# Patient Record
Sex: FEMALE | Race: WHITE | Employment: FULL TIME | URBAN - METROPOLITAN AREA
[De-identification: names, ages, dates, MRNs, and addresses within clinical notes are randomized per-mention and may not be internally consistent; named-entity substitution may affect disease eponyms.]

---
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
## 2019-01-16 ENCOUNTER — OFFICE VISIT (OUTPATIENT)
Dept: OBGYN | Facility: CLINIC | Age: 19
End: 2019-01-16
Attending: NURSE PRACTITIONER
Payer: COMMERCIAL

## 2019-01-16 VITALS
HEART RATE: 77 BPM | WEIGHT: 171.1 LBS | DIASTOLIC BLOOD PRESSURE: 81 MMHG | SYSTOLIC BLOOD PRESSURE: 116 MMHG | HEIGHT: 61 IN | BODY MASS INDEX: 32.3 KG/M2

## 2019-01-16 DIAGNOSIS — Z30.431 ENCOUNTER FOR ROUTINE CHECKING OF INTRAUTERINE CONTRACEPTIVE DEVICE (IUD): Primary | ICD-10-CM

## 2019-01-16 PROCEDURE — G0463 HOSPITAL OUTPT CLINIC VISIT: HCPCS | Mod: ZF

## 2019-01-16 ASSESSMENT — MIFFLIN-ST. JEOR: SCORE: 1488.48

## 2019-01-16 NOTE — LETTER
"1/16/2019       RE: Arelis Hernandes  [address]  [place] [address]     Dear Colleague,    Thank you for referring your patient, Arelis Hernandes, to the [facility] at [facility]. Please see a copy of my visit note below.    SUBJECTIVE:  Arelis Hernandes is a 18 yr old female who presents to clinic today for a routine IUD string check.    She had a Paragard IUD placed on 11/29/2018. She is overall pleased with this contraceptive method and has not concerns today. She had her menses at the time of IUD placement. This lasted 10 days and was slightly heavier than previous menses with more dysmenorrhea.  Since that time, she has had no pelvic pain or bleeding. She started her next period yesterday so had noted onset of spotting and some uterine cramping. Has been able to feel for her IUD strings. Has not been sexually active yet. Denies fever or odor.    OBJECTIVE:  /81 (BP Location: Left arm, Patient Position: Chair)   Pulse 77   Ht 1.549 m (5' 1\")   Wt 77.6 kg (171 lb 1.6 oz)   BMI 32.33 kg/m     General: pleasant female in no acute distress  Abdomen: soft, non-tender  Pelvic Exam:  Vulva: No external lesions, normal hair distribution, no adenopathy  Vagina: Moist, pink, scant brown discharge without odor; well rugated, no lesions  Cervix: nulliparous, smooth, pink, no visible lesions; IUD strings extend 4cm from the external cervical os   Uterus: Normal size, anteverted, non-tender, mobile  Ovaries: No mass, non-tender, mobile    ASSESSMENT:  (Z30.431) Encounter for routine checking of intrauterine contraceptive device (IUD)  (primary encounter diagnosis)    PLAN:  Normal IUD string check. IUD strings were the anticipated length based on insertion note (4cm).  Encouraged monthly IUD string checks. Return to clinic if new onset of pelvic pain, dyspareunia, or intermenstrual bleeding. May manage dysmenorrhea with application of heat and ibuprofen. " Return to clinic for annual exam in 1 year.    Arelis expressed understanding and agreement with the plan for care.    Alice Ramirez, DNP, APRN, WHNP

## 2019-01-16 NOTE — PROGRESS NOTES
"SUBJECTIVE:  Arelis Hernandes is a 18 yr old female who presents to clinic today for a routine IUD string check.    She had a Paragard IUD placed on 11/29/2018. She is overall pleased with this contraceptive method and has not concerns today. She had her menses at the time of IUD placement. This lasted 10 days and was slightly heavier than previous menses with more dysmenorrhea.  Since that time, she has had no pelvic pain or bleeding. She started her next period yesterday so had noted onset of spotting and some uterine cramping. Has been able to feel for her IUD strings. Has not been sexually active yet. Denies fever or odor.      OBJECTIVE:  /81 (BP Location: Left arm, Patient Position: Chair)   Pulse 77   Ht 1.549 m (5' 1\")   Wt 77.6 kg (171 lb 1.6 oz)   BMI 32.33 kg/m    General: pleasant female in no acute distress  Abdomen: soft, non-tender  Pelvic Exam:  Vulva: No external lesions, normal hair distribution, no adenopathy  Vagina: Moist, pink, scant brown discharge without odor; well rugated, no lesions  Cervix: nulliparous, smooth, pink, no visible lesions; IUD strings extend 4cm from the external cervical os   Uterus: Normal size, anteverted, non-tender, mobile  Ovaries: No mass, non-tender, mobile    ASSESSMENT:  (Z30.431) Encounter for routine checking of intrauterine contraceptive device (IUD)  (primary encounter diagnosis)      PLAN:  Normal IUD string check. IUD strings were the anticipated length based on insertion note (4cm).  Encouraged monthly IUD string checks. Return to clinic if new onset of pelvic pain, dyspareunia, or intermenstrual bleeding. May manage dysmenorrhea with application of heat and ibuprofen. Return to clinic for annual exam in 1 year.    Arelis expressed understanding and agreement with the plan for care.    Alice Ramirez, DNP, APRN, WHNP    "

## 2019-01-24 ENCOUNTER — COMMUNICATION - HEALTHEAST (OUTPATIENT)
Dept: PEDIATRICS | Facility: CLINIC | Age: 19
End: 2019-01-24

## 2019-11-08 ENCOUNTER — HEALTH MAINTENANCE LETTER (OUTPATIENT)
Age: 19
End: 2019-11-08

## 2020-09-16 ENCOUNTER — VIRTUAL VISIT (OUTPATIENT)
Dept: FAMILY MEDICINE | Facility: OTHER | Age: 20
End: 2020-09-16
Payer: COMMERCIAL

## 2020-09-16 DIAGNOSIS — Z20.822 SUSPECTED COVID-19 VIRUS INFECTION: Primary | ICD-10-CM

## 2020-09-16 DIAGNOSIS — Z20.822 SUSPECTED COVID-19 VIRUS INFECTION: ICD-10-CM

## 2020-09-16 PROCEDURE — U0003 INFECTIOUS AGENT DETECTION BY NUCLEIC ACID (DNA OR RNA); SEVERE ACUTE RESPIRATORY SYNDROME CORONAVIRUS 2 (SARS-COV-2) (CORONAVIRUS DISEASE [COVID-19]), AMPLIFIED PROBE TECHNIQUE, MAKING USE OF HIGH THROUGHPUT TECHNOLOGIES AS DESCRIBED BY CMS-2020-01-R: HCPCS | Performed by: FAMILY MEDICINE

## 2020-09-16 PROCEDURE — 99421 OL DIG E/M SVC 5-10 MIN: CPT | Performed by: PHYSICIAN ASSISTANT

## 2020-09-16 NOTE — PROGRESS NOTES
"Date: 2020 11:38:57  Clinician: Peggy Samuel  Clinician NPI: [id number]  Patient: Arelis Hernandes  Patient : 2000  Patient Address: [address] [place] [address]  Patient Phone: [phone]  Visit Protocol: URI  Patient Summary:  Arelis is a 20 year old ( : 2000 ) female who initiated a [facility] Visit for COVID-19 (Coronavirus) evaluation and screening. When asked the question \"Please sign me up to receive news, health information and promotions from [facility].\", Arelis responded \"No\".    Arelis states her symptoms started 1-2 days ago.   Her symptoms consist of malaise, anosmia, a sore throat, a cough, and nasal congestion. She is experiencing difficulty breathing due to nasal congestion but she is not short of breath.   Symptom details     Nasal secretions: The color of her mucus is green.    Cough: Arelis coughs every 5-10 minutes and her cough is more bothersome at night. Phlegm comes into her throat when she coughs. She does not believe her cough is caused by post-nasal drip. The color of the phlegm is white and clear.     Sore throat: Arelis reports having mild throat pain (1-3 on a 10 point pain scale), does not have exudate on her tonsils, and can swallow liquids. She is not sure if the lymph nodes in her neck are enlarged. A rash has not appeared on the skin since the sore throat started.      Arelis denies having chills, myalgias, facial pain or pressure, fever, ear pain, vomiting, rhinitis, nausea, wheezing, teeth pain, ageusia, diarrhea, and headache. She also denies taking antibiotic medication in the past month and having recent facial or sinus surgery in the past 60 days.   Precipitating events  Within the past week, Arelis has not been exposed to someone with strep throat. She has not recently been exposed to someone with influenza. Arelis has not been in close contact with any high risk individuals.   Pertinent COVID-19 (Coronavirus) information  In the past 14 " days, Arelis has not worked in a congregate living setting.   She does not work or volunteer as healthcare worker or a  and does not work or volunteer in a healthcare facility.   Arelis has lived in a congregate living setting in the past 14 days. She does not live with a healthcare worker.   Arelis has had a close contact with a laboratory-confirmed COVID-19 patient within 14 days of symptom onset.   Since December 2019, Arelis and has not had upper respiratory infection or influenza-like illness. Has not been diagnosed with lab-confirmed COVID-19 test   Pertinent medical history  Arelis does not get yeast infections when she takes antibiotics.   Arelis does not need a return to work/school note.   Weight: 170 lbs   Arelis does not smoke or use smokeless tobacco.   She denies pregnancy and denies breastfeeding. She is currently menstruating.   Weight: 170 lbs    MEDICATIONS: sertraline oral, ALLERGIES: NKDA  Clinician Response:  Dear Arelis,   Your symptoms show that you may have coronavirus (COVID-19). This illness can cause fever, cough and trouble breathing. Many people get a mild case and get better on their own. Some people can get very sick.  What should I do?  We would like to test you for this virus.   1. Please call [phone] to schedule your visit. Explain that you were [facility] to have a COVID-19 test. Be ready to share your [facility] visit ID number.  The following will serve as your written order for this COVID Test, ordered by me, for the indication of suspected COVID [Z20.828]: The test will be ordered in [employer], our electronic health record, after you are scheduled. It will show as ordered and authorized by Domenic Thacker MD.  Order: COVID-19 (Coronavirus) PCR for SYMPTOMATIC testing from [facility].      2. When it's time for your COVID test:  Stay at least 6 feet away from others. (If someone will drive you to your test, stay in the backseat, as far away from the  as you  "can.)   Cover your mouth and nose with a mask, tissue or washcloth.  Go straight to the testing site. Don't make any stops on the way there or back.      3.Starting now: Stay home and away from others (self-isolate) until:   You've had no fever---and no medicine that reduces fever---for one full day (24 hours). And...   Your other symptoms have gotten better. For example, your cough or breathing has improved. And...   At least 10 days have passed since your symptoms started.       During this time, don't leave the house except for testing or medical care.   Stay in your own room, even for meals. Use your own bathroom if you can.   Stay away from others in your home. No hugging, kissing or shaking hands. No visitors.  Don't go to work, school or anywhere else.    Clean \"high touch\" surfaces often (doorknobs, counters, handles, etc.). Use a household cleaning spray or wipes. You'll find a full list of  on the EPA website: www.epa.gov/pesticide-registration/list-n-disinfectants-use-against-sars-cov-2.   Cover your mouth and nose with a mask, tissue or washcloth to avoid spreading germs.  Wash your hands and face often. Use soap and water.  Caregivers in these groups are at risk for severe illness due to COVID-19:  o People 65 years and older  o People who live in a nursing home or long-term care facility  o People with chronic disease (lung, heart, cancer, diabetes, kidney, liver, immunologic)  o People who have a weakened immune system, including those who:   Are in cancer treatment  Take medicine that weakens the immune system, such as corticosteroids  Had a bone marrow or organ transplant  Have an immune deficiency  Have poorly controlled HIV or AIDS  Are obese (body mass index of 40 or higher)  Smoke regularly   o Caregivers should wear gloves while washing dishes, handling laundry and cleaning bedrooms and bathrooms.  o Use caution when washing and drying laundry: Don't shake dirty laundry, and use the " warmest water setting that you can.  o For more tips, go to www.cdc.gov/coronavirus/2019-ncov/downloads/10Things.pdf.    4.Sign up for [employer]. We know it's scary to hear that you might have COVID-19. We want to track your symptoms to make sure you're okay over the next 2 weeks. Please look for an email from [employer]---this is a free, online program that we'll use to keep in touch. To sign up, follow the link in the email. Learn more at [email or web address]  How can I take care of myself?   Get lots of rest. Drink extra fluids (unless a doctor has told you not to).   Take Tylenol (acetaminophen) for fever or pain. If you have liver or kidney problems, ask your family doctor if it's okay to take Tylenol.   Adults can take either:    650 mg (two 325 mg pills) every 4 to 6 hours, or...   1,000 mg (two 500 mg pills) every 8 hours as needed.    Note: Don't take more than 3,000 mg in one day. Acetaminophen is found in many medicines (both prescribed and over-the-counter medicines). Read all labels to be sure you don't take too much.   For children, check the Tylenol bottle for the right dose. The dose is based on the child's age or weight.    If you have other health problems (like cancer, heart failure, an organ transplant or severe kidney disease): Call your specialty clinic if you don't feel better in the next 2 days.       Know when to call 911. Emergency warning signs include:    Trouble breathing or shortness of breath Pain or pressure in the chest that doesn't go away Feeling confused like you haven't felt before, or not being able to wake up Bluish-colored lips or face.  Where can I get more information?    [employer] -- About COVID-19: [email or web address]   CDC -- What to Do If You're Sick: www.cdc.gov/coronavirus/2019-ncov/about/steps-when-sick.html   CDC -- Ending Home Isolation: www.cdc.gov/coronavirus/2019-ncov/hcp/disposition-in-home-patients.html   CDC -- Caring for  Someone: www.cdc.gov/coronavirus/2019-ncov/if-you-are-sick/care-for-someone.html   [facility] -- Interim Guidance for Hospital Discharge to Home: www.health.[facility].mn.us/diseases/coronavirus/hcp/hospdischarge.pdf   [facility] clinical trials (COVID-19 research studies): clinicalaffairs.[facility]-clinical-trials    Below are the COVID-19 hotlines at the [place] [facility] of [facility] ([facility]). Interpreters are available.    For health questions: Call 467-345-4489 or 1-622.379.1396 (7 a.m. to 7 p.m.) For questions about schools and childcare: Call 179-719-7280 or 1-191.828.6061 (7 a.m. to 7 p.m.)    Diagnosis: Cough  Diagnosis ICD: R05

## 2020-09-17 LAB
SARS-COV-2 RNA SPEC QL NAA+PROBE: NOT DETECTED
SPECIMEN SOURCE: NORMAL

## 2020-11-23 ENCOUNTER — VIRTUAL VISIT (OUTPATIENT)
Dept: FAMILY MEDICINE | Facility: OTHER | Age: 20
End: 2020-11-23

## 2020-11-23 DIAGNOSIS — Z20.822 SUSPECTED COVID-19 VIRUS INFECTION: Primary | ICD-10-CM

## 2020-11-23 NOTE — PROGRESS NOTES
"Date: 2020 11:31:52  Clinician: Wally Woodard  Clinician NPI: [id number]  Patient: Arelis Hernandes  Patient : 2000  Patient Address: [address] [place] [address]  Patient Phone: [phone]  Visit Protocol: URI  Patient Summary:  Arelis is a 20 year old ( : 2000 ) female who initiated a [facility] Visit for COVID-19 (Coronavirus) evaluation and screening. When asked the question \"Please sign me up to receive news, health information and promotions from [facility].\", Arelis responded \"No\".    Arelis states her symptoms started suddenly 3-4 days ago.   Her symptoms consist of myalgia, chills, malaise, a sore throat, a headache, enlarged lymph nodes, and a cough. Arelis also feels feverish.   Symptom details     Cough: Arelis coughs every 5-10 minutes and her cough is more bothersome at night. Phlegm does not come into her throat when she coughs. She does not believe her cough is caused by post-nasal drip.     Sore throat: Arelis reports having mild throat pain (1-3 on a 10 point pain scale), does not have exudate on her tonsils, and can swallow liquids. The lymph nodes in her neck are enlarged. A rash has not appeared on the skin since the sore throat started.     Temperature: Her current temperature is 99.1 degrees Fahrenheit.     Headache: She states the headache is moderate (4-6 on a 10 point pain scale).      Arelis denies having vomiting, rhinitis, facial pain or pressure, teeth pain, ageusia, diarrhea, ear pain, wheezing, nasal congestion, nausea, and anosmia. She also denies taking antibiotic medication in the past month, having recent facial or sinus surgery in the past 60 days, double sickening (worsening symptoms after initial improvement), and having a sinus infection within the past year. She is not experiencing dyspnea.   Precipitating events  Within the past week, Arelis has not been exposed to someone with strep throat. She has not recently been exposed to someone with " influenza. Arelis has been in close contact with the following high risk individuals: people with asthma, heart disease or diabetes.   Pertinent COVID-19 (Coronavirus) information  Arelis does not work or volunteer as healthcare worker or a . In the past 14 days, Arelis has not worked or volunteered at a healthcare facility or group living setting.   In the past 14 days, she also has not lived in a congregate living setting.   Arelis has not had a close contact with a laboratory-confirmed COVID-19 patient within 14 days of symptom onset.    Since December 2019, Arelis has been tested for COVID-19 and has had upper respiratory infection (URI) or influenza-like illness.      Result of COVID-19 test: Negative     Date of her COVID-19 test: 09/16/2020     Date(s) of previous URI or influenza-like illness (free-text): 3/4/2020-3/11/2020     Symptoms Arelis experienced during previous URI or influenza-like illness as reported by the patient (free-text): fatigue, sore throat, cough, runny nose, chills        Pertinent medical history  Arelis does not get yeast infections when she takes antibiotics.   Arelis does not need a return to work/school note.   Weight: 170 lbs   Arelis does not smoke or use smokeless tobacco.   She denies pregnancy and denies breastfeeding. She has menstruated in the past month.   Weight: 170 lbs    MEDICATIONS: sertraline oral, ALLERGIES: NKDA  Clinician Response:  Dear Arelis,   Your symptoms show that you may have coronavirus (COVID-19). This illness can cause fever, cough and trouble breathing. Many people get a mild case and get better on their own. Some people can get very sick.  What should I do?  We would like to test you for this virus.   1. Please call [phone] to schedule your visit. Explain that you were referred by [facility] to have a COVID-19 test. Be ready to share your [facility] visit ID number.  * If you need to schedule in [facility] please call [phone] or for  "[place] employees please call [phone].  * If you need to schedule in the [place] area please call [phone]. [place] employees call [phone].  The following will serve as your written order for this COVID Test, ordered by me, for the indication of suspected COVID [Z20.828]: The test will be ordered in [employer], our electronic health record, after you are scheduled. It will show as ordered and authorized by Domenic Thacker MD.  Order: COVID-19 (Coronavirus) PCR for SYMPTOMATIC testing from [facility].   2. When it's time for your COVID test:  Stay at least 6 feet away from others. (If someone will drive you to your test, stay in the backseat, as far away from the  as you can.)   Cover your mouth and nose with a mask, tissue or washcloth.  Go straight to the testing site. Don't make any stops on the way there or back.      3.Starting now: Stay home and away from others (self-isolate) until:   You've had no fever---and no medicine that reduces fever---for one full day (24 hours). And...   Your other symptoms have gotten better. For example, your cough or breathing has improved. And...   At least 10 days have passed since your symptoms started.       During this time, don't leave the house except for testing or medical care.   Stay in your own room, even for meals. Use your own bathroom if you can.   Stay away from others in your home. No hugging, kissing or shaking hands. No visitors.  Don't go to work, school or anywhere else.    Clean \"high touch\" surfaces often (doorknobs, counters, handles, etc.). Use a household cleaning spray or wipes. You'll find a full list of  on the EPA website: www.epa.gov/pesticide-registration/list-n-disinfectants-use-against-sars-cov-2.   Cover your mouth and nose with a mask, tissue or washcloth to avoid spreading germs.  Wash your hands and face often. Use soap and water.  Caregivers in these groups are at risk for severe illness due to COVID-19:  o People 65 years and " older  o People who live in a nursing home or long-term care facility  o People with chronic disease (lung, heart, cancer, diabetes, kidney, liver, immunologic)  o People who have a weakened immune system, including those who:   Are in cancer treatment  Take medicine that weakens the immune system, such as corticosteroids  Had a bone marrow or organ transplant  Have an immune deficiency  Have poorly controlled HIV or AIDS  Are obese (body mass index of 40 or higher)  Smoke regularly   o Caregivers should wear gloves while washing dishes, handling laundry and cleaning bedrooms and bathrooms.  o Use caution when washing and drying laundry: Don't shake dirty laundry, and use the warmest water setting that you can.  o For more tips, go to www.cdc.gov/coronavirus/2019-ncov/downloads/10Things.pdf.    4.Sign up for [employer]. We know it's scary to hear that you might have COVID-19. We want to track your symptoms to make sure you're okay over the next 2 weeks. Please look for an email from [employer]---this is a free, online program that we'll use to keep in touch. To sign up, follow the link in the email. Learn more at [email or web address]  How can I take care of myself?   Get lots of rest. Drink extra fluids (unless a doctor has told you not to).   Take Tylenol (acetaminophen) for fever or pain. If you have liver or kidney problems, ask your family doctor if it's okay to take Tylenol.   Adults can take either:    650 mg (two 325 mg pills) every 4 to 6 hours, or...   1,000 mg (two 500 mg pills) every 8 hours as needed.    Note: Don't take more than 3,000 mg in one day. Acetaminophen is found in many medicines (both prescribed and over-the-counter medicines). Read all labels to be sure you don't take too much.   For children, check the Tylenol bottle for the right dose. The dose is based on the child's age or weight.    If you have other health problems (like cancer, heart failure, an organ transplant or  severe kidney disease): Call your specialty clinic if you don't feel better in the next 2 days.       Know when to call 911. Emergency warning signs include:    Trouble breathing or shortness of breath Pain or pressure in the chest that doesn't go away Feeling confused like you haven't felt before, or not being able to wake up Bluish-colored lips or face.  Where can I get more information?   [facility] -- About COVID-19: [email or web address].[facility].[email or web address]/covid19/   CDC -- What to Do If You're Sick: www.cdc.gov/coronavirus/2019-ncov/about/steps-when-sick.html   CDC -- Ending Home Isolation: www.cdc.gov/coronavirus/2019-ncov/hcp/disposition-in-home-patients.html   [facility] -- Caring for Someone: www.cdc.gov/coronavirus/2019-ncov/if-you-are-sick/care-for-someone.html   [facility] -- Interim Guidance for Hospital Discharge to Home: www.[facility].mn./diseases/coronavirus/hcp/hospdischarge.pdf   [facility] clinical trials (COVID-19 research studies): clinicalaffairs.[facility]-clinical-trials    Below are the COVID-19 hotlines at the [facility] ([facility]). Interpreters are available.    For health questions: Call 007-970-7652 or 1-814.979.5773 (7 a.m. to 7 p.m.) For questions about schools and childcare: Call 804-162-1572 or 1-872.286.9221 (7 a.m. to 7 p.m.)    Diagnosis: Contact with and (suspected) exposure to other viral communicable diseases  Diagnosis ICD: Z20.828

## 2020-11-24 DIAGNOSIS — Z20.822 SUSPECTED COVID-19 VIRUS INFECTION: ICD-10-CM

## 2020-11-24 DIAGNOSIS — Z20.822 SUSPECTED 2019 NOVEL CORONAVIRUS INFECTION: Primary | ICD-10-CM

## 2020-11-24 PROCEDURE — U0003 INFECTIOUS AGENT DETECTION BY NUCLEIC ACID (DNA OR RNA); SEVERE ACUTE RESPIRATORY SYNDROME CORONAVIRUS 2 (SARS-COV-2) (CORONAVIRUS DISEASE [COVID-19]), AMPLIFIED PROBE TECHNIQUE, MAKING USE OF HIGH THROUGHPUT TECHNOLOGIES AS DESCRIBED BY CMS-2020-01-R: HCPCS | Performed by: FAMILY MEDICINE

## 2020-11-25 LAB
SARS-COV-2 RNA SPEC QL NAA+PROBE: NOT DETECTED
SPECIMEN SOURCE: NORMAL

## 2020-11-30 ENCOUNTER — VIRTUAL VISIT (OUTPATIENT)
Dept: FAMILY MEDICINE | Facility: OTHER | Age: 20
End: 2020-11-30
Payer: COMMERCIAL

## 2020-11-30 PROCEDURE — 99421 OL DIG E/M SVC 5-10 MIN: CPT | Performed by: PHYSICIAN ASSISTANT

## 2020-11-30 NOTE — PROGRESS NOTES
"Date: 2020 14:26:47  Clinician: Eleno Montanez  Clinician NPI: [id number]  Patient: Arelis Hernandes  Patient : 2000  Patient Address: [address] [place] [address]  Patient Phone: [phone]  Visit Protocol: URI  Patient Summary:  Arelis is a 20 year old ( : 2000 ) female who initiated a [facility] Visit for COVID-19 (Coronavirus) evaluation and screening. When asked the question \"Please sign me up to receive news, health information and promotions from [facility].\", Arelis responded \"No\".    Arelis states her symptoms started gradually 3-4 days ago.   Her symptoms consist of a headache, enlarged lymph nodes, a cough, nasal congestion, myalgia, chills, malaise, and a sore throat. She is experiencing difficulty breathing due to nasal congestion but she is not short of breath.   Symptom details     Nasal secretions: The color of her mucus is white.    Cough: Arelis coughs every 5-10 minutes and her cough is more bothersome at night. Phlegm does not come into her throat when she coughs. She does not believe her cough is caused by post-nasal drip.     Sore throat: Arelis reports having moderate throat pain (4-6 on a 10 point pain scale), does not have exudate on her tonsils, and can swallow liquids. The lymph nodes in her neck are enlarged. A rash has not appeared on the skin since the sore throat started.     Headache: She states the headache is mild (1-3 on a 10 point pain scale).      Arelis denies having ear pain, wheezing, fever, nausea, vomiting, rhinitis, facial pain or pressure, teeth pain, ageusia, diarrhea, and anosmia. She also denies taking antibiotic medication in the past month, having recent facial or sinus surgery in the past 60 days, and double sickening (worsening symptoms after initial improvement).   Precipitating events  Within the past week, Arelis has not been exposed to someone with strep throat. She has not recently been exposed to someone with influenza. Arelis has " been in close contact with the following high risk individuals: people with asthma, heart disease or diabetes.   Pertinent COVID-19 (Coronavirus) information  Arelis does not work or volunteer as healthcare worker or a . In the past 14 days, Arelis has not worked or volunteered at a healthcare facility or group living setting.   In the past 14 days, she also has not lived in a congregate living setting.   Arelis has not had a close contact with a laboratory-confirmed COVID-19 patient within 14 days of symptom onset.    Since December 2019, Arelis has been tested for COVID-19 and has had upper respiratory infection (URI) or influenza-like illness.      Result of COVID-19 test: Negative     Date of her COVID-19 test: 10/16/2020     Date(s) of previous URI or influenza-like illness (free-text): 3/5/2020-3/12/2020     Symptoms Arelis experienced during previous URI or influenza-like illness as reported by the patient (free-text): Stuffy nose, fever, cough, sore throat        Pertinent medical history  She has not been told by her provider to avoid NSAIDs.   Arelis does not get yeast infections when she takes antibiotics.   Arelis does not have diabetes. She denies having immunosuppressive conditions (e.g., chemotherapy, HIV, organ transplant, long-term use of steroids or other immunosuppressive medications, splenectomy). She does not have severe COPD and congestive heart failure. She does not have asthma.   Arelis does not need a return to work/school note.   Weight: 170 lbs   Arelis does not smoke or use smokeless tobacco.   She denies pregnancy and denies breastfeeding. She has menstruated in the past month.   Weight: 170 lbs    MEDICATIONS: sertraline oral, ALLERGIES: NKDA  Clinician Response:  Dear Arelis,   Your symptoms show that you may have coronavirus (COVID-19). This illness can cause fever, cough and trouble breathing. Many people get a mild case and get better on their own. Some people can get  "very sick.  What should I do?  We would like to test you for this virus.   1. Please call [phone] to schedule your visit. Explain that you were referred by [facility] to have a COVID-19 test. Be ready to share your [facility] visit ID number.  * If you need to schedule in [facility] please call [phone] or for [employer] employees please call [phone].  * If you need to schedule in the [place] area please call [phone]. [place] employees call [phone].  The following will serve as your written order for this COVID Test, ordered by me, for the indication of suspected COVID [Z20.828]: The test will be ordered in [employer], our electronic health record, after you are scheduled. It will show as ordered and authorized by Domenic Thacker MD.  Order: COVID-19 (Coronavirus) PCR for SYMPTOMATIC testing from [facility].   2. When it's time for your COVID test:  Stay at least 6 feet away from others. (If someone will drive you to your test, stay in the backseat, as far away from the  as you can.)   Cover your mouth and nose with a mask, tissue or washcloth.  Go straight to the testing site. Don't make any stops on the way there or back.      3.Starting now: Stay home and away from others (self-isolate) until:   You've had no fever---and no medicine that reduces fever---for one full day (24 hours). And...   Your other symptoms have gotten better. For example, your cough or breathing has improved. And...   At least 10 days have passed since your symptoms started.       During this time, don't leave the house except for testing or medical care.   Stay in your own room, even for meals. Use your own bathroom if you can.   Stay away from others in your home. No hugging, kissing or shaking hands. No visitors.  Don't go to work, school or anywhere else.    Clean \"high touch\" surfaces often (doorknobs, counters, handles, etc.). Use a household cleaning spray or wipes. You'll find a full list of  on the EPA website: " www.epa.gov/pesticide-registration/list-n-disinfectants-use-against-sars-cov-2.   Cover your mouth and nose with a mask, tissue or washcloth to avoid spreading germs.  Wash your hands and face often. Use soap and water.  Caregivers in these groups are at risk for severe illness due to COVID-19:  o People 65 years and older  o People who live in a nursing home or long-term care facility  o People with chronic disease (lung, heart, cancer, diabetes, kidney, liver, immunologic)  o People who have a weakened immune system, including those who:   Are in cancer treatment  Take medicine that weakens the immune system, such as corticosteroids  Had a bone marrow or organ transplant  Have an immune deficiency  Have poorly controlled HIV or AIDS  Are obese (body mass index of 40 or higher)  Smoke regularly   o Caregivers should wear gloves while washing dishes, handling laundry and cleaning bedrooms and bathrooms.  o Use caution when washing and drying laundry: Don't shake dirty laundry, and use the warmest water setting that you can.  o For more tips, go to www.cdc.gov/coronavirus/2019-ncov/downloads/10Things.pdf.    How can I take care of myself?    Get lots of rest. Drink extra fluids (unless a doctor has told you not to).   Take Tylenol (acetaminophen) for fever or pain. If you have liver or kidney problems, ask your family doctor if it's okay to take Tylenol.   Adults can take either:    650 mg (two 325 mg pills) every 4 to 6 hours, or...   1,000 mg (two 500 mg pills) every 8 hours as needed.    Note: Don't take more than 3,000 mg in one day. Acetaminophen is found in many medicines (both prescribed and over-the-counter medicines). Read all labels to be sure you don't take too much.   For children, check the Tylenol bottle for the right dose. The dose is based on the child's age or weight.    If you have other health problems (like cancer, heart failure, an organ transplant or severe kidney disease): Call your specialty  clinic if you don't feel better in the next 2 days.       Know when to call 911. Emergency warning signs include:    Trouble breathing or shortness of breath Pain or pressure in the chest that doesn't go away Feeling confused like you haven't felt before, or not being able to wake up Bluish-colored lips or face.  Where can I get more information?   [facility] -- About COVID-19: [email or web address]   CDC -- What to Do If You're Sick: www.cdc.gov/coronavirus/2019-ncov/about/steps-when-sick.html   [facility] -- Ending Home Isolation: www.cdc.gov/coronavirus/2019-ncov/hcp/disposition-in-home-patients.html   [facility] -- Caring for Someone: www.cdc.gov/coronavirus/2019-ncov/if-you-are-sick/care-for-someone.html   [facility] -- Interim Guidance for Hospital Discharge to Home: www.[facility].Counts include 234 beds at the [facility].mn./diseases/coronavirus/hcp/hospdischarge.pdf   [facility] clinical trials (COVID-19 research studies): clinicalaffairs.[facility]-clinical-trials    Below are the COVID-19 hotlines at the [facility] ([facility]). Interpreters are available.    For health questions: Call 950-992-2631 or 1-446.353.9128 (7 a.m. to 7 p.m.) For questions about schools and childcare: Call 232-111-8245 or 1-856.607.5596 (7 a.m. to 7 p.m.)    Diagnosis: Contact with and (suspected) exposure to other viral communicable diseases  Diagnosis ICD: Z20.828

## 2020-12-01 DIAGNOSIS — Z20.822 SUSPECTED COVID-19 VIRUS INFECTION: Primary | ICD-10-CM

## 2020-12-02 DIAGNOSIS — Z20.822 SUSPECTED COVID-19 VIRUS INFECTION: ICD-10-CM

## 2020-12-02 PROCEDURE — U0003 INFECTIOUS AGENT DETECTION BY NUCLEIC ACID (DNA OR RNA); SEVERE ACUTE RESPIRATORY SYNDROME CORONAVIRUS 2 (SARS-COV-2) (CORONAVIRUS DISEASE [COVID-19]), AMPLIFIED PROBE TECHNIQUE, MAKING USE OF HIGH THROUGHPUT TECHNOLOGIES AS DESCRIBED BY CMS-2020-01-R: HCPCS | Performed by: PHYSICIAN ASSISTANT

## 2020-12-03 LAB
SARS-COV-2 RNA SPEC QL NAA+PROBE: NOT DETECTED
SPECIMEN SOURCE: NORMAL

## 2020-12-06 ENCOUNTER — HEALTH MAINTENANCE LETTER (OUTPATIENT)
Age: 20
End: 2020-12-06

## 2021-06-23 NOTE — TELEPHONE ENCOUNTER
Describe your symptoms: Patient is at college and she did get a cut with a metal object.  Mother is asking for the last TDAP.  Writer did share tot the last one on record was 5/20/10. Writer did share that since she is so close to her ten year airam some providers like to repeat the dose if it is after five years and has an injury.    Any pain: yes  New/Ongoing: New  How long have you been having symptoms: today    Have you been seen for this:  No.  Appointment offered? Patient declines  Triage offered?: mother is not with daughter.   Home remedies tried: unknown  Pharmacy Name and Location: unknown  Okay to leave a detailed message? No

## 2021-08-13 ENCOUNTER — E-VISIT (OUTPATIENT)
Dept: URGENT CARE | Facility: URGENT CARE | Age: 21
End: 2021-08-13
Payer: COMMERCIAL

## 2021-08-13 DIAGNOSIS — Z20.822 SUSPECTED COVID-19 VIRUS INFECTION: Primary | ICD-10-CM

## 2021-08-13 PROCEDURE — 99421 OL DIG E/M SVC 5-10 MIN: CPT | Performed by: PREVENTIVE MEDICINE

## 2021-08-13 NOTE — PATIENT INSTRUCTIONS
Dear Arelis Hernandes,    Your symptoms show that you may have coronavirus (COVID-19). This illness can cause fever, cough and trouble breathing. Many people get a mild case and get better on their own. Some people can get very sick.    Will I be tested for COVID-19?  We would like to test you for Covid-19 virus. I have placed orders for this test.     To schedule: go to your [employer] home page and scroll down to the section that says  You have an appointment that needs to be scheduled  and click the large green button that says  Schedule Now  and follow the steps to find the next available openings.    If you are unable to complete these [employer] scheduling steps, please call [phone] to schedule your testing.     Return to work/school/ guidance:  Please let your workplace manager and staffing office know when your quarantine ends     We can t give you an exact date as it depends on the above. You can calculate this on your own or work with your manager/staffing office to calculate this. (For example if you were exposed on 10/4, you would have to quarantine for 14 full days. That would be through 10/18. You could return on 10/19.)      If you receive a positive COVID-19 test result, follow the guidance of the those who are giving you the results. Usually the return to work is 10 (or in some cases 20 days from symptom onset.) If you work at [facility], you must also be cleared by Employee Occupational Health and Safety to return to work.        If you receive a negative COVID-19 test result and did not have a high risk exposure to someone with a known positive COVID-19 test, you can return to work once you're free of fever for 24 hours without fever-reducing medication and your symptoms are improving or resolved.      If you receive a negative COVID-19 test and If you had a high risk exposure to someone who has tested positive for COVID-19 then you can return to work 14 days after your last contact  with the positive individual    Note: If you have ongoing exposure to the covid positive person, this quarantine period may be more than 14 days. (For example, if you are continued to be exposed to your child who tested positive and cannot isolate from them, then the quarantine of 7-14 days can't start until your child is no longer contagious. This is typically 10 days from onset of the child's symptoms. So the total duration may be 17-24 days in this case.)    Sign up for [employer].   We know it's scary to hear that you might have COVID-19. We want to track your symptoms to make sure you're okay over the next 2 weeks. Please look for an email from [employer]--this is a free, online program that we'll use to keep in touch. To sign up, follow the link in the email you will receive. Learn more at [email or web address]    How can I take care of myself?    Get lots of rest. Drink extra fluids (unless a doctor has told you not to)    Take Tylenol (acetaminophen) or ibuprofen for fever or pain. If you have liver or kidney problems, ask your family doctor if it's okay to take Tylenol o ibuprofen    If you have other health problems (like cancer, heart failure, an organ transplant or severe kidney disease): Call your specialty clinic if you don't feel better in the next 2 days.    Know when to call 911. Emergency warning signs include:  o Trouble breathing or shortness of breath  o Pain or pressure in the chest that doesn't go away  o Feeling confused like you haven't felt before, or not being able to wake up  o Bluish-colored lips or face    Where can I get more information?  [facility] - About COVID-19:   [email or web address]    CDC - What to Do If You're Sick:   www.cdc.gov/coronavirus/2019-ncov/about/steps-when-sick.html

## 2021-08-14 DIAGNOSIS — Z20.822 SUSPECTED COVID-19 VIRUS INFECTION: Primary | ICD-10-CM

## 2021-08-14 PROCEDURE — U0003 INFECTIOUS AGENT DETECTION BY NUCLEIC ACID (DNA OR RNA); SEVERE ACUTE RESPIRATORY SYNDROME CORONAVIRUS 2 (SARS-COV-2) (CORONAVIRUS DISEASE [COVID-19]), AMPLIFIED PROBE TECHNIQUE, MAKING USE OF HIGH THROUGHPUT TECHNOLOGIES AS DESCRIBED BY CMS-2020-01-R: HCPCS

## 2021-08-14 PROCEDURE — U0005 INFEC AGEN DETEC AMPLI PROBE: HCPCS

## 2021-08-15 LAB — SARS-COV-2 RNA RESP QL NAA+PROBE: NEGATIVE

## 2021-09-25 ENCOUNTER — HEALTH MAINTENANCE LETTER (OUTPATIENT)
Age: 21
End: 2021-09-25

## 2021-11-29 ENCOUNTER — OFFICE VISIT (OUTPATIENT)
Dept: OBGYN | Facility: CLINIC | Age: 21
End: 2021-11-29
Attending: ADVANCED PRACTICE MIDWIFE
Payer: COMMERCIAL

## 2021-11-29 VITALS
DIASTOLIC BLOOD PRESSURE: 72 MMHG | HEART RATE: 108 BPM | WEIGHT: 215.6 LBS | SYSTOLIC BLOOD PRESSURE: 113 MMHG | BODY MASS INDEX: 40.7 KG/M2 | HEIGHT: 61 IN

## 2021-11-29 DIAGNOSIS — Z11.3 ROUTINE SCREENING FOR STI (SEXUALLY TRANSMITTED INFECTION): ICD-10-CM

## 2021-11-29 DIAGNOSIS — Z30.430 ENCOUNTER FOR INSERTION OF INTRAUTERINE CONTRACEPTIVE DEVICE: ICD-10-CM

## 2021-11-29 DIAGNOSIS — F32.89 OTHER DEPRESSION: ICD-10-CM

## 2021-11-29 DIAGNOSIS — Z12.4 SCREENING FOR MALIGNANT NEOPLASM OF CERVIX: ICD-10-CM

## 2021-11-29 DIAGNOSIS — Z01.419 WOMEN'S ANNUAL ROUTINE GYNECOLOGICAL EXAMINATION: Primary | ICD-10-CM

## 2021-11-29 PROCEDURE — 99395 PREV VISIT EST AGE 18-39: CPT | Performed by: ADVANCED PRACTICE MIDWIFE

## 2021-11-29 PROCEDURE — G0145 SCR C/V CYTO,THINLAYER,RESCR: HCPCS | Performed by: ADVANCED PRACTICE MIDWIFE

## 2021-11-29 PROCEDURE — 87591 N.GONORRHOEAE DNA AMP PROB: CPT | Performed by: ADVANCED PRACTICE MIDWIFE

## 2021-11-29 PROCEDURE — G0463 HOSPITAL OUTPT CLINIC VISIT: HCPCS

## 2021-11-29 PROCEDURE — 87491 CHLMYD TRACH DNA AMP PROBE: CPT | Performed by: ADVANCED PRACTICE MIDWIFE

## 2021-11-29 ASSESSMENT — ANXIETY QUESTIONNAIRES
GAD7 TOTAL SCORE: 1
7. FEELING AFRAID AS IF SOMETHING AWFUL MIGHT HAPPEN: NOT AT ALL
8. IF YOU CHECKED OFF ANY PROBLEMS, HOW DIFFICULT HAVE THESE MADE IT FOR YOU TO DO YOUR WORK, TAKE CARE OF THINGS AT HOME, OR GET ALONG WITH OTHER PEOPLE?: NOT DIFFICULT AT ALL
5. BEING SO RESTLESS THAT IT IS HARD TO SIT STILL: NOT AT ALL
7. FEELING AFRAID AS IF SOMETHING AWFUL MIGHT HAPPEN: NOT AT ALL
2. NOT BEING ABLE TO STOP OR CONTROL WORRYING: NOT AT ALL
1. FEELING NERVOUS, ANXIOUS, OR ON EDGE: SEVERAL DAYS
4. TROUBLE RELAXING: NOT AT ALL
GAD7 TOTAL SCORE: 1
6. BECOMING EASILY ANNOYED OR IRRITABLE: NOT AT ALL
3. WORRYING TOO MUCH ABOUT DIFFERENT THINGS: NOT AT ALL
GAD7 TOTAL SCORE: 1
1. FEELING NERVOUS, ANXIOUS, OR ON EDGE: SEVERAL DAYS
5. BEING SO RESTLESS THAT IT IS HARD TO SIT STILL: NOT AT ALL
6. BECOMING EASILY ANNOYED OR IRRITABLE: NOT AT ALL
7. FEELING AFRAID AS IF SOMETHING AWFUL MIGHT HAPPEN: NOT AT ALL
3. WORRYING TOO MUCH ABOUT DIFFERENT THINGS: NOT AT ALL
2. NOT BEING ABLE TO STOP OR CONTROL WORRYING: NOT AT ALL
GAD7 TOTAL SCORE: 1

## 2021-11-29 ASSESSMENT — PATIENT HEALTH QUESTIONNAIRE - PHQ9: 5. POOR APPETITE OR OVEREATING: NOT AT ALL

## 2021-11-29 ASSESSMENT — MIFFLIN-ST. JEOR: SCORE: 1675.34

## 2021-11-29 NOTE — PROGRESS NOTES
Progress Note    SUBJECTIVE:  Arelis Hernandes is an 21 year old, No obstetric history on file., who requests an Annual Preventive Exam.       Concerns today include: Would like to proceed with first pap smear.  Would like screening for chlamydia and gonorrhea.      Happy with Paragard IUD.      Would like refill on Sertraline, feels mood is stable on 50mg.        Menstrual History:  Menstrual History 11/27/2018 11/29/2018 11/29/2021   LAST MENSTRUAL PERIOD 11/27/2018 - 11/19/2021   Menarche Age - 12 -   Period Cycle (Days) - 28 -   Period Duration (Days) - 5 -   Method of Contraception - None -   Period Pattern - Regular -   Menstrual Flow - Light -   Menstrual Control - Tampon -   Dysmenorrhea - Mild -   PMS Symptoms - Cramping -   Reviewed Today - Yes -       Last PAP: Never      Mammogram current: not applicable  Last Mammogram:   No results found.     Last Colonoscopy: NA        HISTORY:  fish oil-omega-3 fatty acids 1000 MG capsule, Take 2 capsules (2 g) by mouth daily  Multiple Vitamin (DAILY MULTIVITAMIN PO), Take  by mouth.  paragard intrauterine copper device, 1 each by Intrauterine route continuous  sertraline (ZOLOFT) 20 MG/ML (HIGH CONC) solution, Take 50 mg by mouth daily    No current facility-administered medications on file prior to visit.    No Known Allergies  Immunization History   Administered Date(s) Administered     COVID-19,PF,Moderna 04/05/2021, 05/03/2021     DTaP, Unspecified 2000, [birth year], 09/12/2000, 04/12/2004     FLU 6-35 months 01/03/2013     Flu, Unspecified 11/17/2005, 12/01/2008, 11/12/2010, 11/11/2011, 11/01/2016     HPV Quadrivalent 06/15/2012, 11/26/2013     HPV9 11/18/2015     HepA, Unspecified 05/14/2008, 03/23/2009     HepB, Unspecified 2000, 1999, 09/12/2000     Hib, Unspecified 2000, 1999, 09/12/2000     Influenza (H1N1) 12/02/2009     Influenza Vaccine IM > 6 months Valent IIV4 (Alfuria,Fluzone) 11/26/2013, 11/14/2015      Influenza Vaccine, 6+MO IM (QUADRIVALENT W/PRESERVATIVES) 2009     MMR 2000, 2004     Meningococcal (Menactra ) 2010, 2015     Pneumococcal (PCV 7) 2000, 2000     Poliovirus, inactivated (IPV) 1999, 1999, 2000, 2004     Rotavirus, pentavalent 1999     Tdap (Adacel,Boostrix) 2010     Varicella 2000, 2008       OB History   No obstetric history on file.     Past Medical History:   Diagnosis Date     Anxiety      Depression      Past Surgical History:   Procedure Laterality Date     MOUTH SURGERY Bilateral 2019    [place] teeth extraction     Family History   Problem Relation Age of Onset     Soft Tissue Cancer Mother         sarcoma     Cancer Mother         Retroperitoneal sarcoma removed in  and recurred in .     Other Cancer Mother      Hyperlipidemia Father         Boderline, no meds     Hypertension Father      Myocardial Infarction Father      Diabetes Maternal Grandmother      Thyroid Disease Maternal Grandmother      Thyroid Disease Maternal Grandfather         possible     Hypertension Maternal Grandfather      Diabetes Paternal Grandfather      Leukemia Paternal Grandfather 65     Heart Disease Paternal Grandfather 65        MI     Cancer Paternal Grandfather 70        Leukemia-     Psoriatic Arthritis Maternal Uncle      Social History     Socioeconomic History     Marital status: Single     Spouse name: None     Number of children: None     Years of education: None     Highest education level: None   Occupational History     None   Tobacco Use     Smoking status: Never Smoker     Smokeless tobacco: Never Used   Substance and Sexual Activity     Alcohol use: Not Currently     Comment: 2-3 drinks per month     Drug use: Not Currently     Types: Marijuana     Comment: every 2 months     Sexual activity: Not Currently     Partners: Female, Male     Birth control/protection: Condom, I.U.D.   Other Topics Concern  "    None   Social History Narrative    Lives with mom (Arcelia), dad (Yandel) and younger brother (Shimon).  Her parents are .     Social Determinants of Health     Financial Resource Strain: Not on file   Food Insecurity: Not on file   Transportation Needs: Not on file   Physical Activity: Not on file   Stress: Not on file   Social Connections: Not on file   Intimate Partner Violence: Not At Risk     Fear of Current or Ex-Partner: No     Emotionally Abused: No     Physically Abused: No     Sexually Abused: No   Housing Stability: Not on file       ROS  [unfilled]  PHQ-9 SCORE 11/29/2021   PHQ-9 Total Score 1     JUAN-7 SCORE 11/29/2018 11/29/2021 11/29/2021   Total Score - - 1 (minimal anxiety)   Total Score 6 1 1         EXAM:  Blood pressure 113/72, pulse 108, height 1.549 m (5' 1\"), weight 97.8 kg (215 lb 9.6 oz), last menstrual period 11/19/2021, not currently breastfeeding. Body mass index is 40.74 kg/m .  General - pleasant female in no acute distress.  Skin - no suspicious lesions or rashes  EENT-  PERRLA, euthyroid with out palpable nodules  Neck - supple without lymphadenopathy.  Lungs - clear to auscultation bilaterally.  Heart - regular rate and rhythm without murmur.  Abdomen - soft, nontender, nondistended, no masses or organomegaly noted.  Musculoskeletal - no gross deformities.  Neurological - normal strength, sensation, and mental status.    Breast Exam:  deferred    Pelvic Exam:  EG/BUS: Normal genital architecture without lesions, erythema or abnormal secretions Bartholin's, Urethra, [place]'s normal   Urethral meatus: normal   Urethra: no masses, tenderness, or scarring   Bladder: no masses or tenderness   Vagina: moist, pink, rugae with physiologic discharge  secretions  Cervix: Nulliparous,, no lesions and IUD strings extend 2.5 cm from external os.        ASSESSMENT:  Encounter Diagnoses   Name Primary?     Women's annual routine gynecological examination Yes     Body mass index 40.0-44.9, adult (H) "      Screening for malignant neoplasm of cervix      Encounter for insertion of intrauterine contraceptive device      Other depression      Routine screening for STI (sexually transmitted infection)         PLAN:   Orders Placed This Encounter   Procedures     Obtaining, preparing and conveyance of cervical or vaginal smear to laboratory.     Orders Placed This Encounter   Medications     sertraline (ZOLOFT) 50 MG tablet     Sig: Take 1 tablet (50 mg) by mouth daily     Dispense:  90 tablet     Refill:  3       Additional teaching done at this visit regarding exercise and mental health.    Return to clinic in one year.  Follow-up as needed.  ERMA Toth CNM        Answers for HPI/ROS submitted by the patient on 11/29/2021  JUAN 7 TOTAL SCORE: 1

## 2021-11-29 NOTE — LETTER
11/29/2021       RE: Arelis Hernandes  [address]  [facility] [address]     Dear Colleague,    Thank you for referring your patient, Arelis Hernandes, to the [facility] [place] at [facility]. Please see a copy of my visit note below.      Progress Note    SUBJECTIVE:  Arelis Hernandes is an 21 year old, No obstetric history on file., who requests an Annual Preventive Exam.       Concerns today include: Would like to proceed with first pap smear.  Would like screening for chlamydia and gonorrhea.      Happy with Paragard IUD.      Would like refill on Sertraline, feels mood is stable on 50mg.        Menstrual History:  Menstrual History 11/27/2018 11/29/2018 11/29/2021   LAST MENSTRUAL PERIOD 11/27/2018 - 11/19/2021   Menarche Age - 12 -   Period Cycle (Days) - 28 -   Period Duration (Days) - 5 -   Method of Contraception - None -   Period Pattern - Regular -   Menstrual Flow - Light -   Menstrual Control - Tampon -   Dysmenorrhea - Mild -   PMS Symptoms - Cramping -   Reviewed Today - Yes -       Last PAP: Never      Mammogram current: not applicable  Last Mammogram:   No results found.     Last Colonoscopy: NA        HISTORY:  fish oil-omega-3 fatty acids 1000 MG capsule, Take 2 capsules (2 g) by mouth daily  Multiple Vitamin (DAILY MULTIVITAMIN PO), Take  by mouth.  paragard intrauterine copper device, 1 each by Intrauterine route continuous  sertraline (ZOLOFT) 20 MG/ML (HIGH CONC) solution, Take 50 mg by mouth daily    No current facility-administered medications on file prior to visit.    No Known Allergies  Immunization History   Administered Date(s) Administered     COVID-19,PF,Moderna 04/05/2021, 05/03/2021     DTaP, Unspecified 1999, 1999, 1999, 09/12/2000, 04/12/2004     FLU 6-35 months 01/03/2013     Flu, Unspecified 11/17/2005, 12/01/2008, 11/12/2010, 11/11/2011, 11/01/2016     HPV Quadrivalent 06/15/2012, 11/26/2013      HPV9 2015     HepA, Unspecified 2008, 2009     HepB, Unspecified 1999, 1999, 2000     Hib, Unspecified 1999, 1999, 2000     Influenza (H1N1) 2009     Influenza Vaccine IM > 6 months Valent IIV4 (Alfuria,Fluzone) 2013, 2015     Influenza Vaccine, 6+MO IM (QUADRIVALENT W/PRESERVATIVES) 2009     MMR 2000, 2004     Meningococcal (Menactra ) 2010, 2015     Pneumococcal (PCV 7) 2000, 2000     Poliovirus, inactivated (IPV) 1999, 1999, 2000, 2004     Rotavirus, pentavalent 1999     Tdap (Adacel,Boostrix) 2010     Varicella 2000, 2008       OB History   No obstetric history on file.     Past Medical History:   Diagnosis Date     Anxiety      Depression      Past Surgical History:   Procedure Laterality Date     MOUTH SURGERY Bilateral 2019    [place] teeth extraction     Family History   Problem Relation Age of Onset     Soft Tissue Cancer Mother         sarcoma     Cancer Mother         Retroperitoneal sarcoma removed in  and recurred in .     Other Cancer Mother      Hyperlipidemia Father         Boderline, no meds     Hypertension Father      Myocardial Infarction Father      Diabetes Maternal Grandmother      Thyroid Disease Maternal Grandmother      Thyroid Disease Maternal Grandfather         possible     Hypertension Maternal Grandfather      Diabetes Paternal Grandfather      Leukemia Paternal Grandfather 65     Heart Disease Paternal Grandfather 65        MI     Cancer Paternal Grandfather 70        Leukemia-     Psoriatic Arthritis Maternal Uncle      Social History     Socioeconomic History     Marital status: Single     Spouse name: None     Number of children: None     Years of education: None     Highest education level: None   Occupational History     None   Tobacco Use     Smoking status: Never Smoker     Smokeless tobacco: Never Used  "  Substance and Sexual Activity     Alcohol use: Not Currently     Comment: 2-3 drinks per month     Drug use: Not Currently     Types: Marijuana     Comment: every 2 months     Sexual activity: Not Currently     Partners: Female, Male     Birth control/protection: Condom, I.U.D.   Other Topics Concern     None   Social History Narrative    Lives with mom (Arcelia), dad (Yandel) and younger brother (Shimon).  Her parents are .     Social Determinants of Health     Financial Resource Strain: Not on file   Food Insecurity: Not on file   Transportation Needs: Not on file   Physical Activity: Not on file   Stress: Not on file   Social Connections: Not on file   Intimate Partner Violence: Not At Risk     Fear of Current or Ex-Partner: No     Emotionally Abused: No     Physically Abused: No     Sexually Abused: No   Housing Stability: Not on file       ROS  [unfilled]  PHQ-9 SCORE 11/29/2021   PHQ-9 Total Score 1     JUAN-7 SCORE 11/29/2018 11/29/2021 11/29/2021   Total Score - - 1 (minimal anxiety)   Total Score 6 1 1         EXAM:  Blood pressure 113/72, pulse 108, height 1.549 m (5' 1\"), weight 97.8 kg (215 lb 9.6 oz), last menstrual period 11/19/2021, not currently breastfeeding. Body mass index is 40.74 kg/m .  General - pleasant female in no acute distress.  Skin - no suspicious lesions or rashes  EENT-  PERRLA, euthyroid with out palpable nodules  Neck - supple without lymphadenopathy.  Lungs - clear to auscultation bilaterally.  Heart - regular rate and rhythm without murmur.  Abdomen - soft, nontender, nondistended, no masses or organomegaly noted.  Musculoskeletal - no gross deformities.  Neurological - normal strength, sensation, and mental status.    Breast Exam:  deferred    Pelvic Exam:  EG/BUS: Normal genital architecture without lesions, erythema or abnormal secretions Bartholin's, Urethra, Smyer's normal   Urethral meatus: normal   Urethra: no masses, tenderness, or scarring   Bladder: no masses or tenderness "   Vagina: moist, pink, rugae with physiologic discharge  secretions  Cervix: Nulliparous,, no lesions and IUD strings extend 2.5 cm from external os.        ASSESSMENT:  Encounter Diagnoses   Name Primary?     Women's annual routine gynecological examination Yes     Body mass index 40.0-44.9, adult (H)      Screening for malignant neoplasm of cervix      Encounter for insertion of intrauterine contraceptive device      Other depression      Routine screening for STI (sexually transmitted infection)         PLAN:   Orders Placed This Encounter   Procedures     Obtaining, preparing and conveyance of cervical or vaginal smear to laboratory.     Orders Placed This Encounter   Medications     sertraline (ZOLOFT) 50 MG tablet     Sig: Take 1 tablet (50 mg) by mouth daily     Dispense:  90 tablet     Refill:  3       Additional teaching done at this visit regarding exercise and mental health.    Return to clinic in one year.  Follow-up as needed.  ERMA Toth CNM        Answers for HPI/ROS submitted by the patient on 11/29/2021  JUAN 7 TOTAL SCORE: 1

## 2021-11-30 LAB
C TRACH DNA SPEC QL NAA+PROBE: NEGATIVE
N GONORRHOEA DNA SPEC QL NAA+PROBE: NEGATIVE

## 2021-11-30 ASSESSMENT — PATIENT HEALTH QUESTIONNAIRE - PHQ9: SUM OF ALL RESPONSES TO PHQ QUESTIONS 1-9: 1

## 2021-11-30 ASSESSMENT — ANXIETY QUESTIONNAIRES: GAD7 TOTAL SCORE: 1

## 2021-11-30 NOTE — PROGRESS NOTES
SUBJECTIVE:   CC: Arelis Hernandes is an 21 year old woman who presents for preventive health visit.     Patient has been advised of split billing requirements and indicates understanding: Yes     Healthy Habits:     Getting at least 3 servings of Calcium per day:  Yes    Bi-annual eye exam:  Yes    Dental care twice a year:  Yes    Sleep apnea or symptoms of sleep apnea:  None    Diet:  Vegetarian/vegan and Breakfast skipped    Frequency of exercise:  2-3 days/week    Duration of exercise:  15-30 minutes    Taking medications regularly:  Yes    Medication side effects:  None    PHQ-2 Total Score: 0    Additional concerns today:  No         Today's PHQ-2 Score:   PHQ-2 ( 1999 Pfizer) 11/29/2021   Q1: Little interest or pleasure in doing things 0   Q2: Feeling down, depressed or hopeless 0   PHQ-2 Score 0   PHQ-2 Total Score (12-17 Years)- Positive if 3 or more points; Administer PHQ-A if positive -       Abuse: Current or Past (Physical, Sexual or Emotional) - No  Do you feel safe in your environment? Yes    Have you ever done Advance Care Planning? (For example, a Health Directive, POLST, or a discussion with a medical provider or your loved ones about your wishes): No, advance care planning information given to patient to review.  Patient plans to discuss their wishes with loved ones or provider.      Social History     Tobacco Use     Smoking status: Never Smoker     Smokeless tobacco: Never Used   Substance Use Topics     Alcohol use: Not Currently     Comment: 2-3 drinks per month     If you drink alcohol do you typically have >3 drinks per day or >7 drinks per week? No    No flowsheet data found.    Reviewed orders with patient.  Reviewed health maintenance and updated orders accordingly - Yes  Lab work is in process    Breast Cancer Screening: Age <40, not appropriate for routine mammogram.     History of abnormal Pap smear: NO - age 21-29 PAP every 3 years recommended     Reviewed and updated as needed this  visit by clinical staff              Reviewed and updated as needed this visit by Provider               Patient Active Problem List   Diagnosis     Encounter for insertion of intrauterine contraceptive device     Depression     Wears glasses     Body mass index 40.0-44.9, adult (H)     Past Medical History:   Diagnosis Date     Anxiety      Depression      Past Surgical History:   Procedure Laterality Date     MOUTH SURGERY Bilateral 2019    [place] teeth extraction     Family History   Problem Relation Age of Onset     Soft Tissue Cancer Mother         sarcoma     Cancer Mother         Retroperitoneal sarcoma removed in  and recurred in 2015.     Other Cancer Mother      Hyperlipidemia Father         Boderline, no meds     Hypertension Father      Myocardial Infarction Father      Diabetes Maternal Grandmother      Thyroid Disease Maternal Grandmother      Thyroid Disease Maternal Grandfather         possible     Hypertension Maternal Grandfather      Diabetes Paternal Grandfather      Leukemia Paternal Grandfather 65     Heart Disease Paternal Grandfather 65        MI     Cancer Paternal Grandfather 70        Leukemia-     Psoriatic Arthritis Maternal Uncle      Social History     Socioeconomic History     Marital status: Single     Spouse name: Not on file     Number of children: Not on file     Years of education: Not on file     Highest education level: Not on file   Occupational History     Not on file   Tobacco Use     Smoking status: Never Smoker     Smokeless tobacco: Never Used   Substance and Sexual Activity     Alcohol use: Not Currently     Comment: 2-3 drinks per month     Drug use: Not Currently     Types: Marijuana     Comment: every 2 months     Sexual activity: Not Currently     Partners: Female, Male     Birth control/protection: Condom, I.U.D.     Comment: Paragard placed 2018   Other Topics Concern     Not on file   Social History Narrative    Lives with mom (Arcelia), dad (Yandel) and  younger brother (Shimon).  Her parents are .        Recently graduated from [employer] in [place].     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     Lives with her parents in [place].      Social Determinants of Health     Financial Resource Strain: Low Risk      Difficulty of Paying Living Expenses: Not very hard   Food Insecurity: No Food Insecurity     Worried About Running Out of Food in the Last Year: Never true     Ran Out of Food in the Last Year: Never true   Transportation Needs: No Transportation Needs     Lack of Transportation (Medical): No     Lack of Transportation (Non-Medical): No   Physical Activity: Insufficiently Active     Days of Exercise per Week: 4 days     Minutes of Exercise per Session: 30 min   Stress: No Stress Concern Present     Feeling of Stress : Only a little   Social Connections: Socially Isolated     Frequency of Communication with Friends and Family: More than three times a week     Frequency of Social Gatherings with Friends and Family: Three times a week     Attends Spiritism Services: Never     Active Member of Clubs or Organizations: No     Attends Club or Organization Meetings: Not on file     Marital Status: Never    Intimate Partner Violence: Not At Risk     Fear of Current or Ex-Partner: No     Emotionally Abused: No     Physically Abused: No     Sexually Abused: No   Housing Stability: Low Risk      Unable to Pay for Housing in the Last Year: No     Number of Places Lived in the Last Year: 2     Unstable Housing in the Last Year: No     Current Outpatient Medications   Medication     fish oil-omega-3 fatty acids 1000 MG capsule     Multiple Vitamin (DAILY MULTIVITAMIN PO)     paragard intrauterine copper device     sertraline (ZOLOFT) 50 MG tablet     No current facility-administered medications for this visit.      No Known Allergies      Review of Systems   Constitutional: Negative for chills and fever.   HENT: Negative for congestion, ear pain, hearing  "loss and sore throat.    Eyes: Negative for pain and visual disturbance.   Respiratory: Negative for cough and shortness of breath.    Cardiovascular: Negative for chest pain, palpitations and peripheral edema.   Gastrointestinal: Negative for abdominal pain, constipation, diarrhea, heartburn, hematochezia and nausea.   Genitourinary: Negative for dysuria, frequency, genital sores, hematuria and urgency.   Musculoskeletal: Negative for arthralgias, joint swelling and myalgias.   Skin: Negative for rash.   Neurological: Negative for dizziness, weakness, headaches and paresthesias.   Psychiatric/Behavioral: Negative for mood changes. The patient is not nervous/anxious.         OBJECTIVE:   /68 (BP Location: Right arm, Patient Position: Sitting, Cuff Size: Adult Regular)   Pulse 100   Temp 98.4  F (36.9  C) (Tympanic)   Ht 1.537 m (5' 0.5\")   Wt 97.8 kg (215 lb 11.2 oz)   LMP 11/19/2021   SpO2 95%   BMI 41.43 kg/m    Physical Exam  Constitutional: appears to be in no acute distress, comfortable, pleasant.   Eyes: anicteric, conjunctiva clear without drainage or erythema. SHERRI.   Ears, Nose and Throat: tympanic membranes gray with LR,  nose without nasal discharge. OP: no erythema to posterior pharynx, negative post nasal drainage, tonsils +1 no erythema or exudate.  Neck: supple, thyroid palpable,not enlarged, no nodules   Breast: Exam deferred (deferred after discussion of exam options with patient, no symptoms or concerns).   Cardiovascular: regular rate and rhythm, normal S1 and S2, no murmurs, rubs or gallops, peripheral pulses full and symmetric; negative peripheral edema   Respiratory: Air entry throughout. Breathing pattern unlabored without the use of accessory muscles. Clear to auscultation A and P, no wheezes or crackles, normal breath sounds.    Gastrointestinal: rounded, soft. Positive bowel sounds x4, nontender, no masses.   Genitourinary: Exam deferred (deferred after discussion of exam " options with patient, no symptoms or concerns, pap utd).   Musculoskeletal: full range of motion, no edema.   Skin: pink, turgor smooth and elastic. Negative for lesions or dryness.  Neurological: normal gait, no tremor.   Psychological: appropriate mood and affect.   Lymphatic: no cervical, axillary, supraclavicular, or infraclavicular lymphadenopathy.    Diagnostic Test Results:  Labs reviewed in Epic    ASSESSMENT/PLAN:   (Z00.00) Routine general medical examination at a health care facility  (primary encounter diagnosis)  Age appropriate screening and preventative care have been addressed today. Vaccinations have been updated. . Recommend annual vision exams as well as biannual dental exams. They will follow up for annual physical again in one year.   - REVIEW OF HEALTH MAINTENANCE PROTOCOL ORDERS,   - Lipid panel reflex to direct LDL Fasting  - Pap done last week through OB/GYN, results pending  - Paragard IUD in place  - Not currently sexually active, STI screening done last week      (Z11.4) Screening for human immunodeficiency virus without presence of risk factors  Pt is agreeable to recommended one time screening for HIV.  - HIV Antigen Antibody Combo           (Z11.59) Encounter for HCV screening test for low risk patient  Pt is agreeable to recommended one time screening for Hep C.   - Hepatitis C Screen Reflex to HCV RNA Quant and Genotype            (Z68.41) BMI 40.0-44.9, adult (H)  Body mass index is 41.43 kg/m . Eats a vegetarian diet, most meals are home cooked. Recommended avoiding processed carbohydrates and added sugar. Does not consume sugary beverages.   - Hemoglobin A1c            (F41.9,  F32.A) Anxiety and depression  Stable, no concerns. Continue sertraline without changes. Follow-up annually and prn.      (Z23) High priority for 2019-nCoV vaccine  - COVID-19,PF,PFIZER (12+ Yrs PURPLE LABEL)            Follow-up: Lab results pending, will follow-up as indicated after reviewing results.  "        COUNSELING:  Reviewed preventive health counseling, as reflected in patient instructions  Special attention given to:        Regular exercise       Healthy diet/nutrition       Vision screening       Immunizations    Vaccinated for: COVID-19       Contraception       Safe sex practices/STD prevention       Consider Hep C screening for all patients one time for ages 18-79 years       HIV screeninx in teen years, 1x in adult years, and at intervals if high risk    Estimated body mass index is 41.43 kg/m  as calculated from the following:    Height as of this encounter: 1.537 m (5' 0.5\").    Weight as of this encounter: 97.8 kg (215 lb 11.2 oz).    Weight management plan: Discussed healthy diet and exercise guidelines    She reports that she has never smoked. She has never used smokeless tobacco.      Counseling Resources:  ATP IV Guidelines  Pooled Cohorts Equation Calculator  Breast Cancer Risk Calculator  BRCA-Related Cancer Risk Assessment: FHS-7 Tool  FRAX Risk Assessment  ICSI Preventive Guidelines  Dietary Guidelines for Americans,   USDA's MyPlate  ASA Prophylaxis  Lung CA Screening    ERMA Ochoa CNP  [facility]  "

## 2021-12-01 ENCOUNTER — OFFICE VISIT (OUTPATIENT)
Dept: PEDIATRICS | Facility: CLINIC | Age: 21
End: 2021-12-01
Payer: COMMERCIAL

## 2021-12-01 VITALS
WEIGHT: 215.7 LBS | DIASTOLIC BLOOD PRESSURE: 68 MMHG | HEART RATE: 100 BPM | HEIGHT: 61 IN | OXYGEN SATURATION: 95 % | TEMPERATURE: 98.4 F | BODY MASS INDEX: 40.72 KG/M2 | SYSTOLIC BLOOD PRESSURE: 110 MMHG

## 2021-12-01 DIAGNOSIS — Z00.00 ROUTINE GENERAL MEDICAL EXAMINATION AT A HEALTH CARE FACILITY: Primary | ICD-10-CM

## 2021-12-01 DIAGNOSIS — F32.A ANXIETY AND DEPRESSION: ICD-10-CM

## 2021-12-01 DIAGNOSIS — Z23 HIGH PRIORITY FOR 2019-NCOV VACCINE: ICD-10-CM

## 2021-12-01 DIAGNOSIS — Z11.59 ENCOUNTER FOR HCV SCREENING TEST FOR LOW RISK PATIENT: ICD-10-CM

## 2021-12-01 DIAGNOSIS — F41.9 ANXIETY AND DEPRESSION: ICD-10-CM

## 2021-12-01 DIAGNOSIS — Z11.4 SCREENING FOR HUMAN IMMUNODEFICIENCY VIRUS WITHOUT PRESENCE OF RISK FACTORS: ICD-10-CM

## 2021-12-01 LAB
BKR LAB AP GYN ADEQUACY: NORMAL
BKR LAB AP GYN INTERPRETATION: NORMAL
BKR LAB AP HPV REFLEX: NO
BKR LAB AP LMP: NORMAL
BKR LAB AP PREVIOUS ABNORMAL: NORMAL
HBA1C MFR BLD: 5.4 % (ref 0–5.6)
PATH REPORT.COMMENTS IMP SPEC: NORMAL
PATH REPORT.COMMENTS IMP SPEC: NORMAL
PATH REPORT.RELEVANT HX SPEC: NORMAL

## 2021-12-01 PROCEDURE — 80061 LIPID PANEL: CPT | Performed by: NURSE PRACTITIONER

## 2021-12-01 PROCEDURE — 36415 COLL VENOUS BLD VENIPUNCTURE: CPT | Performed by: NURSE PRACTITIONER

## 2021-12-01 PROCEDURE — 86803 HEPATITIS C AB TEST: CPT | Performed by: NURSE PRACTITIONER

## 2021-12-01 PROCEDURE — 0004A COVID-19,PF,PFIZER (12+ YRS): CPT | Performed by: NURSE PRACTITIONER

## 2021-12-01 PROCEDURE — 87389 HIV-1 AG W/HIV-1&-2 AB AG IA: CPT | Performed by: NURSE PRACTITIONER

## 2021-12-01 PROCEDURE — 91300 COVID-19,PF,PFIZER (12+ YRS): CPT | Performed by: NURSE PRACTITIONER

## 2021-12-01 PROCEDURE — 99395 PREV VISIT EST AGE 18-39: CPT | Mod: 25 | Performed by: NURSE PRACTITIONER

## 2021-12-01 PROCEDURE — 83036 HEMOGLOBIN GLYCOSYLATED A1C: CPT | Performed by: NURSE PRACTITIONER

## 2021-12-01 SDOH — ECONOMIC STABILITY: INCOME INSECURITY: HOW HARD IS IT FOR YOU TO PAY FOR THE VERY BASICS LIKE FOOD, HOUSING, MEDICAL CARE, AND HEATING?: NOT VERY HARD

## 2021-12-01 SDOH — HEALTH STABILITY: PHYSICAL HEALTH: ON AVERAGE, HOW MANY MINUTES DO YOU ENGAGE IN EXERCISE AT THIS LEVEL?: 30 MIN

## 2021-12-01 SDOH — HEALTH STABILITY: PHYSICAL HEALTH: ON AVERAGE, HOW MANY DAYS PER WEEK DO YOU ENGAGE IN MODERATE TO STRENUOUS EXERCISE (LIKE A BRISK WALK)?: 4 DAYS

## 2021-12-01 SDOH — ECONOMIC STABILITY: INCOME INSECURITY: IN THE LAST 12 MONTHS, WAS THERE A TIME WHEN YOU WERE NOT ABLE TO PAY THE MORTGAGE OR RENT ON TIME?: NO

## 2021-12-01 SDOH — ECONOMIC STABILITY: TRANSPORTATION INSECURITY
IN THE PAST 12 MONTHS, HAS THE LACK OF TRANSPORTATION KEPT YOU FROM MEDICAL APPOINTMENTS OR FROM GETTING MEDICATIONS?: NO

## 2021-12-01 SDOH — ECONOMIC STABILITY: FOOD INSECURITY: WITHIN THE PAST 12 MONTHS, THE FOOD YOU BOUGHT JUST DIDN'T LAST AND YOU DIDN'T HAVE MONEY TO GET MORE.: NEVER TRUE

## 2021-12-01 SDOH — ECONOMIC STABILITY: TRANSPORTATION INSECURITY
IN THE PAST 12 MONTHS, HAS LACK OF TRANSPORTATION KEPT YOU FROM MEETINGS, WORK, OR FROM GETTING THINGS NEEDED FOR DAILY LIVING?: NO

## 2021-12-01 SDOH — ECONOMIC STABILITY: FOOD INSECURITY: WITHIN THE PAST 12 MONTHS, YOU WORRIED THAT YOUR FOOD WOULD RUN OUT BEFORE YOU GOT MONEY TO BUY MORE.: NEVER TRUE

## 2021-12-01 ASSESSMENT — ENCOUNTER SYMPTOMS
ARTHRALGIAS: 0
SORE THROAT: 0
COUGH: 0
HEMATURIA: 0
EYE PAIN: 0
WEAKNESS: 0
PALPITATIONS: 0
ABDOMINAL PAIN: 0
MYALGIAS: 0
DIARRHEA: 0
JOINT SWELLING: 0
CHILLS: 0
FEVER: 0
CONSTIPATION: 0
DYSURIA: 0
PARESTHESIAS: 0
HEADACHES: 0
SHORTNESS OF BREATH: 0
HEMATOCHEZIA: 0
HEARTBURN: 0
DIZZINESS: 0
NERVOUS/ANXIOUS: 0
NAUSEA: 0
FREQUENCY: 0

## 2021-12-01 ASSESSMENT — MIFFLIN-ST. JEOR: SCORE: 1667.85

## 2021-12-01 ASSESSMENT — SOCIAL DETERMINANTS OF HEALTH (SDOH)
DO YOU BELONG TO ANY CLUBS OR ORGANIZATIONS SUCH AS CHURCH GROUPS UNIONS, FRATERNAL OR ATHLETIC GROUPS, OR SCHOOL GROUPS?: NO
ARE YOU MARRIED, WIDOWED, DIVORCED, SEPARATED, NEVER MARRIED, OR LIVING WITH A PARTNER?: NEVER MARRIED
IN A TYPICAL WEEK, HOW MANY TIMES DO YOU TALK ON THE PHONE WITH FAMILY, FRIENDS, OR NEIGHBORS?: MORE THAN THREE TIMES A WEEK
HOW OFTEN DO YOU GET TOGETHER WITH FRIENDS OR RELATIVES?: THREE TIMES A WEEK
HOW OFTEN DO YOU ATTEND CHURCH OR RELIGIOUS SERVICES?: NEVER

## 2021-12-01 ASSESSMENT — LIFESTYLE VARIABLES
HOW MANY STANDARD DRINKS CONTAINING ALCOHOL DO YOU HAVE ON A TYPICAL DAY: 1 OR 2
HOW OFTEN DO YOU HAVE A DRINK CONTAINING ALCOHOL: MONTHLY OR LESS
HOW OFTEN DO YOU HAVE SIX OR MORE DRINKS ON ONE OCCASION: NEVER

## 2021-12-02 LAB
CHOLEST SERPL-MCNC: 151 MG/DL
FASTING STATUS PATIENT QL REPORTED: YES
HCV AB SERPL QL IA: NONREACTIVE
HDLC SERPL-MCNC: 43 MG/DL
HIV 1+2 AB+HIV1 P24 AG SERPL QL IA: NONREACTIVE
LDLC SERPL CALC-MCNC: 90 MG/DL
NONHDLC SERPL-MCNC: 108 MG/DL
TRIGL SERPL-MCNC: 89 MG/DL

## 2023-01-07 ENCOUNTER — HEALTH MAINTENANCE LETTER (OUTPATIENT)
Age: 23
End: 2023-01-07

## 2023-01-17 ENCOUNTER — TELEPHONE (OUTPATIENT)
Dept: OBGYN | Facility: CLINIC | Age: 23
End: 2023-01-17
Payer: COMMERCIAL

## 2023-01-17 NOTE — TELEPHONE ENCOUNTER
Attempted to call patient regarding prescription refill request and needing to schedule an annual appointment. Patient did not . Left  asking for a call back at [phone].

## 2023-04-07 ENCOUNTER — OFFICE VISIT (OUTPATIENT)
Dept: MIDWIFE SERVICES | Facility: CLINIC | Age: 23
End: 2023-04-07
Payer: COMMERCIAL

## 2023-04-07 VITALS
DIASTOLIC BLOOD PRESSURE: 67 MMHG | TEMPERATURE: 99 F | SYSTOLIC BLOOD PRESSURE: 110 MMHG | BODY MASS INDEX: 41.76 KG/M2 | WEIGHT: 217.4 LBS | HEART RATE: 97 BPM

## 2023-04-07 DIAGNOSIS — Z23 HIGH PRIORITY FOR 2019-NCOV VACCINE: ICD-10-CM

## 2023-04-07 DIAGNOSIS — Z11.3 SCREEN FOR STD (SEXUALLY TRANSMITTED DISEASE): ICD-10-CM

## 2023-04-07 DIAGNOSIS — Z01.419 WELL WOMAN EXAM WITH ROUTINE GYNECOLOGICAL EXAM: Primary | ICD-10-CM

## 2023-04-07 PROCEDURE — 87591 N.GONORRHOEAE DNA AMP PROB: CPT | Performed by: ADVANCED PRACTICE MIDWIFE

## 2023-04-07 PROCEDURE — 99385 PREV VISIT NEW AGE 18-39: CPT | Mod: 25 | Performed by: ADVANCED PRACTICE MIDWIFE

## 2023-04-07 PROCEDURE — 87389 HIV-1 AG W/HIV-1&-2 AB AG IA: CPT | Performed by: ADVANCED PRACTICE MIDWIFE

## 2023-04-07 PROCEDURE — 86803 HEPATITIS C AB TEST: CPT | Performed by: ADVANCED PRACTICE MIDWIFE

## 2023-04-07 PROCEDURE — 87340 HEPATITIS B SURFACE AG IA: CPT | Performed by: ADVANCED PRACTICE MIDWIFE

## 2023-04-07 PROCEDURE — 86780 TREPONEMA PALLIDUM: CPT | Performed by: ADVANCED PRACTICE MIDWIFE

## 2023-04-07 PROCEDURE — 36415 COLL VENOUS BLD VENIPUNCTURE: CPT | Performed by: ADVANCED PRACTICE MIDWIFE

## 2023-04-07 PROCEDURE — 91312 COVID-19 VACCINE BIVALENT BOOSTER 12+ (PFIZER): CPT | Performed by: ADVANCED PRACTICE MIDWIFE

## 2023-04-07 PROCEDURE — 0124A COVID-19 VACCINE BIVALENT BOOSTER 12+ (PFIZER): CPT | Performed by: ADVANCED PRACTICE MIDWIFE

## 2023-04-07 PROCEDURE — 87491 CHLMYD TRACH DNA AMP PROBE: CPT | Performed by: ADVANCED PRACTICE MIDWIFE

## 2023-04-07 ASSESSMENT — ANXIETY QUESTIONNAIRES
7. FEELING AFRAID AS IF SOMETHING AWFUL MIGHT HAPPEN: NOT AT ALL
GAD7 TOTAL SCORE: 2
5. BEING SO RESTLESS THAT IT IS HARD TO SIT STILL: NOT AT ALL
1. FEELING NERVOUS, ANXIOUS, OR ON EDGE: SEVERAL DAYS
6. BECOMING EASILY ANNOYED OR IRRITABLE: NOT AT ALL
2. NOT BEING ABLE TO STOP OR CONTROL WORRYING: NOT AT ALL
3. WORRYING TOO MUCH ABOUT DIFFERENT THINGS: NOT AT ALL
IF YOU CHECKED OFF ANY PROBLEMS ON THIS QUESTIONNAIRE, HOW DIFFICULT HAVE THESE PROBLEMS MADE IT FOR YOU TO DO YOUR WORK, TAKE CARE OF THINGS AT HOME, OR GET ALONG WITH OTHER PEOPLE: NOT DIFFICULT AT ALL
GAD7 TOTAL SCORE: 2

## 2023-04-07 ASSESSMENT — PATIENT HEALTH QUESTIONNAIRE - PHQ9
SUM OF ALL RESPONSES TO PHQ QUESTIONS 1-9: 1
5. POOR APPETITE OR OVEREATING: SEVERAL DAYS

## 2023-04-07 NOTE — PROGRESS NOTES
Arelis is a 22 year old No obstetric history on file. female who presents for annual exam.     Menses are regular q 28-30 days and normal lasting 5 days.  Menses flow: normal and medium.  Patient's last menstrual period was 03/26/2023.. Using IUD for contraception.  She is not currently considering pregnancy.  Besides routine health maintenance, STD  screen and Covid Booster.  GYNECOLOGIC HISTORY:  Menarche: 13  Age at first intercourse: 17  Arelis is sexually active with boyfriend/only/male partner(s) and is currently in monogamous relationship with boyfriend.    History sexually transmitted infections:No STD history and Discuss  STI testing offered?  Accepted  MAGALI exposure: No  History of abnormal Pap smear: NO - age 21-29 PAP every 3 years recommended  Family history of breast CA: No  Family history of uterine/ovarian CA: Mother Tumor by her uterus 2016?    Family history of colon CA: No    HEALTH MAINTENANCE:  Cholesterol: (  Cholesterol   Date Value Ref Range Status   12/01/2021 151 <200 mg/dL Final   12/30/2016 158 <=169 mg/dL Final    History of abnormal lipids: No  Mammo: N/A . History of abnormal Mammo: YES, No.  Regular Self Breast Exams: Yes  Calcium/Vitamin D intake: source:  dairy Adequate? Yes  TSH: (  TSH   Date Value Ref Range Status   11/27/2018 1.50 0.40 - 4.00 mU/L Final    )  Pap; (No results found for: PAP )    HISTORY:  OB History   No obstetric history on file.     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     Other Cancer Mother      Hyperlipidemia Father         Boderline, no meds     Hypertension Father      Myocardial Infarction Father      Diabetes Maternal Grandmother       Thyroid Disease Maternal Grandmother      Thyroid Disease Maternal Grandfather         possible     Hypertension Maternal Grandfather      Diabetes Paternal Grandfather      Leukemia Paternal Grandfather 65     Heart Disease Paternal Grandfather 65        MI     Cancer Paternal Grandfather 70        Leukemia-     Psoriatic Arthritis Maternal Uncle      Social History     Socioeconomic History     Marital status: Single     Spouse name: None     Number of children: None     Years of education: None     Highest education level: None   Tobacco Use     Smoking status: Never     Smokeless tobacco: Never   Substance and Sexual Activity     Alcohol use: Not Currently     Comment: 2-3 drinks per month     Drug use: Not Currently     Types: Marijuana     Comment: every 2 months     Sexual activity: Not Currently     Partners: Female, Male     Birth control/protection: Condom, I.U.D.     Comment: Paragard placed 2018   Social History Narrative    Lives with mom (Arcelia), dad (Yandel) and younger brother (Shimon).  Her parents are .        Recently graduated from [employer] in [employer] science.     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     Lives with her parents in [place].      Social Determinants of Health     Financial Resource Strain: Low Risk  (2021)    Overall Financial Resource Strain (CARDIA)      Difficulty of Paying Living Expenses: Not very hard   Food Insecurity: No Food Insecurity (2021)    Hunger Vital Sign      Worried About Running Out of Food in the Last Year: Never true      Ran Out of Food in the Last Year: Never true   Transportation Needs: No Transportation Needs (2021)    PRAPARE - Transportation      Lack of Transportation (Medical): No      Lack of Transportation (Non-Medical): No   Physical Activity: Insufficiently Active (2021)    Exercise Vital Sign      Days of Exercise per Week: 4 days      Minutes of Exercise per Session: 30 min   Stress: No Stress Concern Present  (12/1/2021)    [employer] [place] [employer] - Occupational Stress Questionnaire      Feeling of Stress : Only a little   Social Connections: Socially Isolated (12/1/2021)    Social Connection and Isolation Panel [NHANES]      Frequency of Communication with Friends and Family: More than three times a week      Frequency of Social Gatherings with Friends and Family: Three times a week      Attends Sikh Services: Never      Active Member of Clubs or Organizations: No      Marital Status: Never    Intimate Partner Violence: Not At Risk (11/29/2021)    Humiliation, Afraid, Rape, and Kick questionnaire      Fear of Current or Ex-Partner: No      Emotionally Abused: No      Physically Abused: No      Sexually Abused: No   Housing Stability: Low Risk  (12/1/2021)    Housing Stability Vital Sign      Unable to Pay for Housing in the Last Year: No      Number of Places Lived in the Last Year: 2      Unstable Housing in the Last Year: No       Current Outpatient Medications:      Multiple Vitamin (DAILY MULTIVITAMIN PO), Take  by mouth., Disp: , Rfl:      paragard intrauterine copper device, 1 each by Intrauterine route continuous, Disp: , Rfl:      sertraline (ZOLOFT) 50 MG tablet, Take 1 tablet (50 mg) by mouth daily, Disp: 90 tablet, Rfl: 3     fish oil-omega-3 fatty acids 1000 MG capsule, Take 2 capsules (2 g) by mouth daily, Disp: , Rfl: 0   No Known Allergies    Past medical, surgical, social and family history were reviewed and updated in EPIC.    ROS:   C:     NEGATIVE for fever, chills, change in weight  I:       NEGATIVE for worrisome rashes, moles or lesions  E:     NEGATIVE for vision changes or irritation  E/M: NEGATIVE for ear, mouth and throat problems  R:     NEGATIVE for significant cough or SOB  CV:   NEGATIVE for chest pain, palpitations or peripheral edema  GI:     NEGATIVE for nausea, abdominal pain, heartburn, or change in bowel habits  :   NEGATIVE for frequency, dysuria,  hematuria, vaginal discharge, or irregular bleeding  M:     NEGATIVE for significant arthralgias or myalgia  N:      NEGATIVE for weakness, dizziness or paresthesias  E:      NEGATIVE for temperature intolerance, skin/hair changes  P:      NEGATIVE for changes in mood or affect.    EXAM:  /67   Pulse 97   Temp 99  F (37.2  C) (Temporal)   Wt 98.6 kg (217 lb 6.4 oz)   LMP 03/26/2023   Breastfeeding No   BMI 41.76 kg/m     BMI: Body mass index is 41.76 kg/m .  Constitutional: healthy, alert and no distress  Head: Normocephalic. No masses, lesions, tenderness or abnormalities  Neck: Neck supple. Trachea midline. No adenopathy. Thyroid symmetric, normal size.   Cardiovascular: RRR.   Respiratory: Negative.   Breast: Deferred  Gastrointestinal: Abdomen soft, non-tender, non-distended. No masses, organomegaly.  :  Vulva:  No external lesions, normal female hair distribution, no inguinal adenopathy.    Urethra:  Midline, non-tender, well supported, no discharge  Cervix WNL no lesion noted, Paragard IUD string clearly visible approx 2.5 cm from cervical os, os clear of IUD body   Vagina:  Moist, pink, no abnormal discharge, no lesions  Uterus:  Normal size, anteverted  , non-tender, freely mobile  Ovaries:  No masses appreciated, non-tender, mobile  Rectal Exam: deferred  Musculoskeletal: extremities normal  Skin: no suspicious lesions or rashes  Psychiatric: Affect appropriate, cooperative,mentation appears normal.     COUNSELING:   Reviewed preventive health counseling, as reflected in patient instructions       Regular exercise       Healthy diet/nutrition       Safe sex practices/STD prevention   reports that she has never smoked. She has never used smokeless tobacco.    Body mass index is 41.76 kg/m .    FRAX Risk Assessment    ASSESSMENT:  22 year old female with satisfactory annual exam  (Z01.419) Well woman exam with routine gynecological exam  (primary encounter diagnosis)  Comment:   Plan:      (Z11.3) Screen for STD (sexually transmitted disease)  Comment:   Plan: NEISSERIA GONORRHOEA PCR, CHLAMYDIA TRACHOMATIS        PCR, Hepatitis B surface antigen, Hepatitis C         antibody, HIV Antigen Antibody Combo, Treponema        Abs w Reflex to RPR and Titer          RTC 1 yr for annual, will be due for papsmear 12/2024.  RTC sooner with c/o    ERMA Solis CNM

## 2023-04-08 LAB
C TRACH DNA SPEC QL NAA+PROBE: NEGATIVE
HBV SURFACE AG SERPL QL IA: NONREACTIVE
HCV AB SERPL QL IA: NONREACTIVE
HIV 1+2 AB+HIV1 P24 AG SERPL QL IA: NONREACTIVE
N GONORRHOEA DNA SPEC QL NAA+PROBE: NEGATIVE
T PALLIDUM AB SER QL: NONREACTIVE

## 2023-05-01 SDOH — ECONOMIC STABILITY: FOOD INSECURITY: WITHIN THE PAST 12 MONTHS, YOU WORRIED THAT YOUR FOOD WOULD RUN OUT BEFORE YOU GOT MONEY TO BUY MORE.: NEVER TRUE

## 2023-05-01 SDOH — ECONOMIC STABILITY: FOOD INSECURITY: WITHIN THE PAST 12 MONTHS, THE FOOD YOU BOUGHT JUST DIDN'T LAST AND YOU DIDN'T HAVE MONEY TO GET MORE.: NEVER TRUE

## 2023-05-01 SDOH — ECONOMIC STABILITY: INCOME INSECURITY: HOW HARD IS IT FOR YOU TO PAY FOR THE VERY BASICS LIKE FOOD, HOUSING, MEDICAL CARE, AND HEATING?: NOT HARD AT ALL

## 2023-05-01 SDOH — HEALTH STABILITY: PHYSICAL HEALTH: ON AVERAGE, HOW MANY DAYS PER WEEK DO YOU ENGAGE IN MODERATE TO STRENUOUS EXERCISE (LIKE A BRISK WALK)?: 3 DAYS

## 2023-05-01 SDOH — HEALTH STABILITY: PHYSICAL HEALTH: ON AVERAGE, HOW MANY MINUTES DO YOU ENGAGE IN EXERCISE AT THIS LEVEL?: 90 MIN

## 2023-05-01 SDOH — ECONOMIC STABILITY: INCOME INSECURITY: IN THE LAST 12 MONTHS, WAS THERE A TIME WHEN YOU WERE NOT ABLE TO PAY THE MORTGAGE OR RENT ON TIME?: NO

## 2023-05-01 ASSESSMENT — ENCOUNTER SYMPTOMS
BREAST MASS: 0
CHILLS: 0
HEARTBURN: 0
HEMATURIA: 0
NERVOUS/ANXIOUS: 0
DIZZINESS: 0
MYALGIAS: 0
SHORTNESS OF BREATH: 0
ABDOMINAL PAIN: 0
WEAKNESS: 0
JOINT SWELLING: 0
HEMATOCHEZIA: 0
DYSURIA: 0
EYE PAIN: 0
FEVER: 0
NAUSEA: 0
COUGH: 0
HEADACHES: 0
PARESTHESIAS: 0
SORE THROAT: 0
FREQUENCY: 0
DIARRHEA: 0
ARTHRALGIAS: 0
PALPITATIONS: 0
CONSTIPATION: 0

## 2023-05-01 ASSESSMENT — LIFESTYLE VARIABLES
HOW OFTEN DO YOU HAVE A DRINK CONTAINING ALCOHOL: MONTHLY OR LESS
HOW OFTEN DO YOU HAVE SIX OR MORE DRINKS ON ONE OCCASION: NEVER
AUDIT-C TOTAL SCORE: 1
HOW MANY STANDARD DRINKS CONTAINING ALCOHOL DO YOU HAVE ON A TYPICAL DAY: 1 OR 2
SKIP TO QUESTIONS 9-10: 1

## 2023-05-01 ASSESSMENT — SOCIAL DETERMINANTS OF HEALTH (SDOH)
HOW OFTEN DO YOU GET TOGETHER WITH FRIENDS OR RELATIVES?: TWICE A WEEK
HOW OFTEN DO YOU ATTEND CHURCH OR RELIGIOUS SERVICES?: 1 TO 4 TIMES PER YEAR
IN A TYPICAL WEEK, HOW MANY TIMES DO YOU TALK ON THE PHONE WITH FAMILY, FRIENDS, OR NEIGHBORS?: TWICE A WEEK
DO YOU BELONG TO ANY CLUBS OR ORGANIZATIONS SUCH AS CHURCH GROUPS UNIONS, FRATERNAL OR ATHLETIC GROUPS, OR SCHOOL GROUPS?: NO
ARE YOU MARRIED, WIDOWED, DIVORCED, SEPARATED, NEVER MARRIED, OR LIVING WITH A PARTNER?: NEVER MARRIED

## 2023-05-05 ENCOUNTER — OFFICE VISIT (OUTPATIENT)
Dept: PEDIATRICS | Facility: CLINIC | Age: 23
End: 2023-05-05
Payer: COMMERCIAL

## 2023-05-05 VITALS
HEIGHT: 61 IN | BODY MASS INDEX: 40.76 KG/M2 | RESPIRATION RATE: 18 BRPM | WEIGHT: 215.9 LBS | OXYGEN SATURATION: 96 % | TEMPERATURE: 98.3 F | SYSTOLIC BLOOD PRESSURE: 104 MMHG | DIASTOLIC BLOOD PRESSURE: 60 MMHG | HEART RATE: 91 BPM

## 2023-05-05 DIAGNOSIS — Z00.00 ROUTINE GENERAL MEDICAL EXAMINATION AT A HEALTH CARE FACILITY: Primary | ICD-10-CM

## 2023-05-05 DIAGNOSIS — F32.89 OTHER DEPRESSION: ICD-10-CM

## 2023-05-05 PROCEDURE — 99395 PREV VISIT EST AGE 18-39: CPT | Performed by: NURSE PRACTITIONER

## 2023-05-05 PROCEDURE — 99213 OFFICE O/P EST LOW 20 MIN: CPT | Mod: 25 | Performed by: NURSE PRACTITIONER

## 2023-05-05 ASSESSMENT — ENCOUNTER SYMPTOMS
FREQUENCY: 0
NAUSEA: 0
FEVER: 0
HEARTBURN: 0
BREAST MASS: 0
ABDOMINAL PAIN: 0
CHILLS: 0
PARESTHESIAS: 0
SHORTNESS OF BREATH: 0
WEAKNESS: 0
DYSURIA: 0
JOINT SWELLING: 0
PALPITATIONS: 0
DIZZINESS: 0
DIARRHEA: 0
EYE PAIN: 0
COUGH: 0
NERVOUS/ANXIOUS: 0
SORE THROAT: 0
HEMATOCHEZIA: 0
HEMATURIA: 0
MYALGIAS: 0
HEADACHES: 0
CONSTIPATION: 0
ARTHRALGIAS: 0

## 2023-05-05 ASSESSMENT — PAIN SCALES - GENERAL: PAINLEVEL: NO PAIN (0)

## 2023-05-05 NOTE — PROGRESS NOTES
SUBJECTIVE:   CC: Arelis is an 23 year old who presents for preventive health visit.   Patient has been advised of split billing requirements and indicates understanding: Yes     Healthy Habits:     Getting at least 3 servings of Calcium per day:  Yes    Bi-annual eye exam:  Yes    Dental care twice a year:  Yes    Sleep apnea or symptoms of sleep apnea:  None    Diet:  Vegetarian/vegan and Breakfast skipped    Frequency of exercise:  2-3 days/week    Duration of exercise:  30-45 minutes    Taking medications regularly:  Yes    Medication side effects:  None    PHQ-2 Total Score: 0    Additional concerns today:  No      Today's PHQ-2 Score:       5/1/2023     2:19 PM   PHQ-2 ( 1999 [employer])   Q1: Little interest or pleasure in doing things 0   Q2: Feeling down, depressed or hopeless 0   PHQ-2 Score 0   Q1: Little interest or pleasure in doing things Not at all    Not at all   Q2: Feeling down, depressed or hopeless Not at all    Not at all   PHQ-2 Score 0    0       Social History     Tobacco Use     Smoking status: Never     Smokeless tobacco: Never   Vaping Use     Vaping status: Not on file   Substance Use Topics     Alcohol use: Not Currently     Comment: 2-3 drinks per month           5/1/2023     2:19 PM   Alcohol Use   Prescreen: >3 drinks/day or >7 drinks/week? Not Applicable     Reviewed orders with patient.  Reviewed health maintenance and updated orders accordingly - Yes  BP Readings from Last 3 Encounters:   05/05/23 104/60   04/07/23 110/67   12/01/21 110/68    Wt Readings from Last 3 Encounters:   05/05/23 97.9 kg (215 lb 14.4 oz)   04/07/23 98.6 kg (217 lb 6.4 oz)   12/01/21 97.8 kg (215 lb 11.2 oz)         Breast Cancer Screening: Routine screening mammogram not recommended with age <40.     History of abnormal Pap smear: NO - age 21-29 PAP every 3 years recommended      11/29/2021     4:07 PM   PAP / HPV   PAP Negative for Intraepithelial Lesion or Malignancy (NILM)       Reviewed and updated as  needed this visit by clinical staff                  Reviewed and updated as needed this visit by Provider                 Patient Active Problem List   Diagnosis     Encounter for insertion of intrauterine contraceptive device     Depression     Wears glasses     Body mass index 40.0-44.9, adult (H)     Past Medical History:   Diagnosis Date     Anxiety      Depression      Depressive disorder      Past Surgical History:   Procedure Laterality Date     MOUTH SURGERY Bilateral 2019    [place] teeth extraction     Family History   Problem Relation Age of Onset     Soft Tissue Cancer Mother         sarcoma     Cancer Mother         Retroperitoneal sarcoma removed in  and recurred in 2015.     Other Cancer Mother      Hyperlipidemia Father         Boderline, no meds     Hypertension Father      Myocardial Infarction Father      Diabetes Maternal Grandmother      Thyroid Disease Maternal Grandmother      Thyroid Disease Maternal Grandfather         possible     Hypertension Maternal Grandfather      Diabetes Paternal Grandfather      Leukemia Paternal Grandfather 65     Heart Disease Paternal Grandfather 65        MI     Cancer Paternal Grandfather 70        Leukemia-     Psoriatic Arthritis Maternal Uncle      Social History     Socioeconomic History     Marital status: Single     Spouse name: Not on file     Number of children: Not on file     Years of education: Not on file     Highest education level: Not on file   Occupational History     Not on file   Tobacco Use     Smoking status: Never     Smokeless tobacco: Never   Vaping Use     Vaping status: Never Used   Substance and Sexual Activity     Alcohol use: Not Currently     Comment: 2-3 drinks per month     Drug use: Not Currently     Types: Marijuana     Comment: every 2 months     Sexual activity: Yes     Partners: Male     Birth control/protection: I.U.D.     Comment: Paragard placed 2018   Other Topics Concern     Parent/sibling w/ CABG, MI or  angioplasty before 65F 55M? No   Social History Narrative    Lives with mom (Arcelia), dad (Yandel) and younger brother (Shimon).  Her parents are .        Recently graduated from [employer] in [employer] science.     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     Lives with her parents in [place].      Social Determinants of Health     Financial Resource Strain: Low Risk  (5/1/2023)    Overall Financial Resource Strain (CARDIA)      Difficulty of Paying Living Expenses: Not hard at all   Food Insecurity: No Food Insecurity (5/1/2023)    Hunger Vital Sign      Worried About Running Out of Food in the Last Year: Never true      Ran Out of Food in the Last Year: Never true   Transportation Needs: No Transportation Needs (5/1/2023)    PRAPARE - Transportation      Lack of Transportation (Medical): No      Lack of Transportation (Non-Medical): No   Physical Activity: Sufficiently Active (5/1/2023)    Exercise Vital Sign      Days of Exercise per Week: 3 days      Minutes of Exercise per Session: 90 min   Stress: No Stress Concern Present (5/1/2023)    [employer] [place] [employer] - Occupational Stress Questionnaire      Feeling of Stress : Only a little   Social Connections: Moderately Isolated (5/1/2023)    Social Connection and Isolation Panel [NHANES]      Frequency of Communication with Friends and Family: Twice a week      Frequency of Social Gatherings with Friends and Family: Twice a week      Attends Adventism Services: 1 to 4 times per year      Active Member of Clubs or Organizations: No      Attends Club or Organization Meetings: Not on file      Marital Status: Never    Intimate Partner Violence: Not At Risk (11/29/2021)    Humiliation, Afraid, Rape, and Kick questionnaire      Fear of Current or Ex-Partner: No      Emotionally Abused: No      Physically Abused: No      Sexually Abused: No   Housing Stability: Low Risk  (5/1/2023)    Housing Stability Vital Sign      Unable to Pay for Housing in  "the Last Year: No      Number of Places Lived in the Last Year: 1      Unstable Housing in the Last Year: No     Current Outpatient Medications   Medication     Multiple Vitamin (DAILY MULTIVITAMIN PO)     paragard intrauterine copper device     sertraline (ZOLOFT) 50 MG tablet     No current facility-administered medications for this visit.      No Known Allergies      Review of Systems   Constitutional: Negative for chills and fever.   HENT: Negative for congestion, ear pain, hearing loss and sore throat.    Eyes: Negative for pain and visual disturbance.   Respiratory: Negative for cough and shortness of breath.    Cardiovascular: Negative for chest pain, palpitations and peripheral edema.   Gastrointestinal: Negative for abdominal pain, constipation, diarrhea, heartburn, hematochezia and nausea.   Breasts:  Negative for tenderness, breast mass and discharge.   Genitourinary: Negative for dysuria, frequency, genital sores, hematuria, pelvic pain, urgency, vaginal bleeding and vaginal discharge.   Musculoskeletal: Negative for arthralgias, joint swelling and myalgias.   Skin: Negative for rash.   Neurological: Negative for dizziness, weakness, headaches and paresthesias.   Psychiatric/Behavioral: Negative for mood changes. The patient is not nervous/anxious.         OBJECTIVE:   /60 (BP Location: Right arm, Patient Position: Sitting, Cuff Size: Adult Large)   Pulse 91   Temp 98.3  F (36.8  C) (Tympanic)   Resp 18   Ht 1.54 m (5' 0.63\")   Wt 97.9 kg (215 lb 14.4 oz)   LMP 04/24/2023   SpO2 96%   BMI 41.29 kg/m    Physical Exam  Constitutional: appears to be in no acute distress, comfortable, pleasant.   Eyes: anicteric, conjunctiva clear without drainage or erythema. SHERRI.   Ears, Nose and Throat: tympanic membranes gray with LR,  nose without nasal discharge. OP: no erythema to posterior pharynx, negative post nasal drainage, tonsils +1 no erythema or exudate.  Neck: supple, thyroid palpable,not " enlarged, no nodules   Breast: Exam deferred (deferred after discussion of exam options with patient, no symptoms or concerns).   Cardiovascular: regular rate and rhythm, normal S1 and S2, no murmurs, rubs or gallops, peripheral pulses full and symmetric; negative peripheral edema   Respiratory: Air entry throughout. Breathing pattern unlabored without the use of accessory muscles. Clear to auscultation A and P, no wheezes or crackles, normal breath sounds.    Gastrointestinal: rounded, soft. Positive bowel sounds x4, nontender, no masses.   Genitourinary: Exam deferred (deferred after discussion of exam options with patient, no symptoms or concerns, pap up to date).   Musculoskeletal: full range of motion, no edema.   Skin: pink, turgor smooth and elastic. Negative for lesions or dryness.  Neurological: normal gait, no tremor.   Psychological: appropriate mood and affect.   Lymphatic: no cervical, axillary, supraclavicular, or infraclavicular lymphadenopathy.    Diagnostic Test Results:  Labs reviewed in Epic    ASSESSMENT/PLAN:   (Z00.00) Routine general medical examination at a health care facility  (primary encounter diagnosis)  Age appropriate screening and preventative care have been addressed today. Vaccinations have been reviewed and are up to date. Recommend annual vision exams as well as biannual dental exams. They will follow up for annual physical again in one year.   - Pap utd  - Imm utd    (F32.89) Other depression  Chronic, stable. Patient interested in decreasing her dose. Will plan to decrease to 25 mg daily. [facility] with [facility] update. Will update script at that time if things are going well on the 25 mg dose. Refilled.  - sertraline (ZOLOFT) 50 MG tablet  - OFFICE/OUTPT VISIT,EST,LEVL III             COUNSELING:  Reviewed preventive health counseling, as reflected in patient instructions  Special attention given to:        Vision screening       Immunizations        Contraception       Safe sex practices/STD prevention      She reports that she has never smoked. She has never used smokeless tobacco.             ERMA Ochoa CNP  [facility]

## 2023-07-26 ENCOUNTER — MYC REFILL (OUTPATIENT)
Dept: PEDIATRICS | Facility: CLINIC | Age: 23
End: 2023-07-26
Payer: COMMERCIAL

## 2023-07-26 DIAGNOSIS — F32.89 OTHER DEPRESSION: ICD-10-CM

## 2023-07-28 RX ORDER — SERTRALINE HYDROCHLORIDE 25 MG/1
25 TABLET, FILM COATED ORAL DAILY
Qty: 90 TABLET | Refills: 1 | Status: SHIPPED | OUTPATIENT
Start: 2023-07-28 | End: 2024-02-02

## 2023-07-28 NOTE — TELEPHONE ENCOUNTER
Routing refill request to provider for review/approval because:  See update from pt  Mari Masters RN

## 2023-11-03 DIAGNOSIS — F32.89 OTHER DEPRESSION: ICD-10-CM

## 2024-02-02 DIAGNOSIS — F32.89 OTHER DEPRESSION: ICD-10-CM

## 2024-02-05 RX ORDER — SERTRALINE HYDROCHLORIDE 25 MG/1
25 TABLET, FILM COATED ORAL DAILY
Qty: 90 TABLET | Refills: 1 | Status: SHIPPED | OUTPATIENT
Start: 2024-02-05 | End: 2024-08-05

## 2024-05-13 RX ORDER — COVID-19 VACCINE, MRNA 0.23 MG/2.25ML
INJECTION, SUSPENSION INTRAMUSCULAR
COMMUNITY
Start: 2023-09-18 | End: 2024-05-17

## 2024-05-14 NOTE — PROGRESS NOTES
Subjective:     Arelis Hernandes is a 24 year old female who presents for an annual gyn exam. She is a return [facility] patient, new to the [facility]. Last PE with Harriett Ley CNM 4/2023. She has a primary provider whom she sees for ongoing health maintenance and medication management of depression. Shares not planning pregnancy this year but open to future pregnancies.   Notes a healthy year, mental health is stable at current dose and no unwanted effects.  Has paragard in place since 11/2018; has menses every 28-30 days on average, had two events with 40 day cycles.     Family: lives with her immediate family, no new stressors. Current partner for 1.5 years.  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 Not currently taking any multivitamin or B vitamins  Safety (seatbelt, gun access, IPV, hx abuse): negative screen  Tobacco/Alcohol/Drug Use: none  Anxiety Depression Screening: negative, below  Sexual Partners: 1 male, monogamous  Last Breast Exam: 1 year ago    Colonoscopy: N/A  Lipid Profile:  last screen 12/2021 wnl, low HDL  Glucose Screen:  hgb A1C 12/2021 wnl  Prevention of Osteoporosis:  dietary sources calcium  Last Dexa: N/A    Answers submitted by the patient for this visit:  Patient Health Questionnaire (Submitted on 5/16/2024)  If you checked off any problems, how difficult have these problems made it for you to do your work, take care of things at home, or get along with other people?: Not difficult at all  PHQ9 TOTAL SCORE: 2  JUAN-7 (Submitted on 5/16/2024)  JUAN 7 TOTAL SCORE: 2    Phq-9 (Phq-9)-Developed By Cheri Byrd,Elisabeth Bains,Matthew Childers And Colleagues,With An Educational Santy From [employer]  2002.    5/16/2024  2:01 AM CDT - Filed by Patient   Over the last 2 weeks, how often have you been bothered by any of the following problems?    1. Little interest or pleasure in doing things Not at all   2.  Feeling down, depressed, or hopeless Not at all   3.  Trouble falling or staying asleep, or sleeping too much Several days   4.  Feeling tired or having little energy Several days   5.  Poor appetite or overeating Not at all   6.  Feeling bad about yourself - or that you are a failure or have let yourself or your family down Not at all   7.  Trouble concentrating on things, such as reading the newspaper or watching television Not at all   8.  Moving or speaking so slowly that other people could have noticed. Or the opposite - being so fidgety or restless that you have been moving around a lot more than usual Not at all   9.  Thoughts that you would be better off dead, or of hurting yourself in some way Not at all   If you checked off any problems, how difficult have these problems made it for you to do your work, take care of things at home, or get along with other people? Not difficult at all   PHQ9 TOTAL SCORE (range: 0 - 27) 2 (Minimal depression)     Jarocho-7 ([employer] Inc,2002; Used With Permission)    5/16/2024  2:01 AM CDT - Filed by Patient   Over the last two weeks, how often have you been bothered by the following problems?    1. Feeling nervous, anxious, or on edge Not at all   2. Not being able to stop or control worrying Several days   3. Worrying too much about different things Several days   4. Trouble relaxing Not at all   5. Being so restless that it is hard to sit still Not at all   6. Becoming easily annoyed or irritable Not at all   7. Feeling afraid, as if something awful might happen Not at all   If you checked off any problems on this questionnaire, how difficult have these problems made it for you to do your work, take care of things at home, or get along with other people? Not difficult at  all   JUAN 7 TOTAL SCORE (range: 0 - 21) 2 (minimal anxiety)     Phq2 (   1999 [employer] Inc,All Rights Reserved. Used With Permission. Developed By DrsMelony Byrd,Elisabeth Bains,Matthew Childers And Colleagues,With An Educational Santy From [employer].)    5/16/2024  2:01 AM CDT - Filed by Patient   Q1: Little interest or pleasure in doing things Not at all   Q2: Feeling down, depressed or hopeless Not at all   PHQ-2 Score (range: 0 - 6) 0     Myc Communicable Disease Screening    5/16/2024  2:02 AM CDT - Filed by Patient   Do you have any of the following new or worsening symptoms ? None of these   Have you had close contact with someone who has traveled outside the country in the past 30 days and is sick? No     [facility] Add'L Questions    5/16/2024  2:03 AM CDT - Filed by Patient   Are you a  ? Did Not Serve      Immunization History   Administered Date(s) Administered    COVID-19 12+ (2023-24) (Pfizer) 09/18/2023    COVID-19 Bivalent 12+ (Pfizer) 04/07/2023    COVID-19 MONOVALENT 12+ (Pfizer) 12/01/2021    COVID-19 Monovalent 18+ (Moderna) 04/05/2021, 05/03/2021    DTAP (<7y) 1999, 1999, 1999, 09/12/2000, 04/12/2004    DTaP, Unspecified 1999, 1999, 1999, 09/12/2000, 04/12/2004    Flu, Unspecified 11/17/2005, 12/01/2008, 11/12/2010, 11/11/2011, 11/01/2016    HIB(PRP-OMP)(PedvaxHIB) 1999, 1999, 09/12/2000    HIB, Unspecified 1999, 1999, 09/12/2000    HPV Quadrivalent 06/15/2012, 11/26/2013    HPV9 11/18/2015    HepA, Unspecified 05/14/2008, 03/23/2009    HepB, Unspecified 1999, 1999, 09/12/2000    Hepatitis A (ADULT 19+) 05/14/2008, 03/23/2009    Hepatitis B, Adult 1999, 1999, 09/12/2000    Influenza (H1N1) 12/02/2009    Influenza (IIV3) PF 11/17/2005, 12/01/2008, 09/26/2009, 11/12/2010, 11/11/2011    Influenza Vaccine 18-64 (Flublok) 11/10/2022    Influenza Vaccine >6 months,quad, PF 11/26/2013,  11/14/2015, 11/17/2016, 10/30/2018, 11/06/2019, 10/12/2020    Influenza Vaccine, 6+MO IM (QUADRIVALENT W/PRESERVATIVES) 09/26/2009, 11/04/2021    Influenza, seasonal, injectable, PF 01/03/2013    Influenza,INJ,MDCK,PF,Quad >6mo(Flucelvax) 09/18/2023    MMR 05/05/2000, 04/12/2004    Meningococcal ACWY (Menactra ) 05/28/2010, 11/18/2015    Pneumococcal (PCV 7) 09/12/2000, 11/13/2000    Poliovirus, inactivated (IPV) 1999, 1999, 05/05/2000, 04/12/2004    Rotavirus, Pentavalent 1999    TDAP (Adacel,Boostrix) 05/28/2010, 01/24/2019    Varicella 05/05/2000, 05/14/2008     Immunization status: up to date and documented.    Gynecologic History  Patient's last menstrual period was 05/06/2024.  Contraception: IUD, paragard    Cancer screening:   Last Pap: November 2021. Results were: NIL, no HPV performed    Last mammogram: na. Results were: na      OB History   No obstetric history on file.       Current Outpatient Medications   Medication Sig Dispense Refill    loratadine (CLARITIN) 10 MG tablet Take 10 mg by mouth daily      paragard intrauterine copper device 1 each by Intrauterine route continuous (Patient taking differently: 1 each by Intrauterine route continuous Placed 11-)      sertraline (ZOLOFT) 25 MG tablet Take 1 tablet (25 mg) by mouth daily 90 tablet 1     Past Medical History:   Diagnosis Date    Anxiety     Depression     Depressive disorder      Past Surgical History:   Procedure Laterality Date    MOUTH SURGERY Bilateral 2019    [place] teeth extraction     Patient has no known allergies.  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    Other Cancer Mother     Hyperlipidemia Father         Boderline, no meds    Hypertension Father     Myocardial Infarction Father     Diabetes Maternal Grandmother     Thyroid Disease Maternal Grandmother     Thyroid  Disease Maternal Grandfather         possible    Hypertension Maternal Grandfather     Diabetes Paternal Grandfather     Leukemia Paternal Grandfather 65    Heart Disease Paternal Grandfather 65        MI    Cancer Paternal Grandfather 70        Leukemia-    Psoriatic Arthritis Maternal Uncle      Social History     Socioeconomic History    Marital status: Single     Spouse name: Not on file    Number of children: Not on file    Years of education: Not on file    Highest education level: Not on file   Occupational History    Not on file   Tobacco Use    Smoking status: Never     Passive exposure: Never    Smokeless tobacco: Never   Vaping Use    Vaping status: Never Used   Substance and Sexual Activity    Alcohol use: Yes     Comment: 2-3 drinks per month or less    Drug use: Not Currently     Types: Marijuana     Comment: every 2 months    Sexual activity: Yes     Partners: Male     Birth control/protection: I.U.D.     Comment: Paragard placed    Other Topics Concern    Parent/sibling w/ CABG, MI or angioplasty before 65F 55M? No   Social History Narrative    Lives with mom (Arcelia), dad (Yandel) and younger brother (Shimon).  Her parents are .        Recently graduated from [employer] in [employer] science.     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     Lives with her parents in [place].         23 - Boyfrienjose l Rousseau, got into medical school in [place]. They have been together a year. Looking at moving with him in the next 4-6 months.         Luba Smith, DNP, APRN, CNP    23     Social Determinants of Health     Financial Resource Strain: Low Risk  (2023)    Overall Financial Resource Strain (CARDIA)     Difficulty of Paying Living Expenses: Not hard at all   Food Insecurity: No Food Insecurity (2023)    Hunger Vital Sign     Worried About Running Out of Food in the Last Year: Never true     Ran Out of Food in the Last Year: Never true   Transportation Needs: No Transportation Needs  (5/1/2023)    PRAPARE - Transportation     Lack of Transportation (Medical): No     Lack of Transportation (Non-Medical): No   Physical Activity: Sufficiently Active (5/1/2023)    Exercise Vital Sign     Days of Exercise per Week: 3 days     Minutes of Exercise per Session: 90 min   Stress: No Stress Concern Present (5/1/2023)    [place] [employer] - Occupational Stress Questionnaire     Feeling of Stress : Only a little   Social Connections: Moderately Isolated (5/1/2023)    Social Connection and Isolation Panel [NHANES]     Frequency of Communication with Friends and Family: Twice a week     Frequency of Social Gatherings with Friends and Family: Twice a week     Attends Scientology Services: 1 to 4 times per year     Active Member of Clubs or Organizations: No     Attends Club or Organization Meetings: Not on file     Marital Status: Never    Interpersonal Safety: Low Risk  (5/17/2024)    Interpersonal Safety     Do you feel physically and emotionally safe where you currently live?: Yes     Within the past 12 months, have you been hit, slapped, kicked or otherwise physically hurt by someone?: No     Within the past 12 months, have you been humiliated or emotionally abused in other ways by your partner or ex-partner?: No   Housing Stability: Low Risk  (5/1/2023)    Housing Stability Vital Sign     Unable to Pay for Housing in the Last Year: No     Number of Places Lived in the Last Year: 1     Unstable Housing in the Last Year: No       Review of Systems  General:  Denies problem  Eyes: Denies problem  Ears/Nose/Throat: Denies problem  Cardiovascular: Denies problem  Respiratory:  Denies problem  Gastrointestinal:  denies problems  Genitourinary: denies problems  Musculoskeletal:  Denies problem  Skin: Denies problem  Neurologic:denies problems  Psychiatric:  stable with current medication dose  Endocrine: denies problems        Objective:      Vitals:    05/17/24 1253   BP: 106/78  "  Pulse: 76   Weight: 101.2 kg (223 lb)   Height: 1.537 m (5' 0.5\")       Physical Exam:  General Appearance: Alert, cooperative, no distress, appears stated age  Skin: Skin color, texture, turgor normal, no rashes or lesions. no varicosities.  Throat: Lips, mucosa, and tongue normal; teeth and gums normal  HEENT: grossly normal; otoscopic and opthalmic exam not performed.   Neck: Supple, symmetrical, trachea midline, no adenopathy;  thyroid: not enlarged, symmetric, no tenderness/mass/nodules  Lungs: Clear to auscultation bilaterally, respirations unlabored  Breasts: No breast masses, tenderness, asymmetry, or nipple discharge .   Heart: Regular rate and rhythm, S1 and S2 normal, no murmur, rub, or gallop  Abdomen: Soft, non-tender. No organomegaly or masses  Pelvic:Not indicated     Assessment:     Healthy female exam.  IUD in place, paragard  Routine sti screening  Depression with medication management, stable     Plan:      1. Discussed nutrition and exercise.  Advised Multivitamin, Vitamin D3 4000IU geltab and an omega 3 supplement daily. Discussed importance of calcium. Discussed importance of intentional exercise but also discussed myths about benefits of only vigorous vs benefits of even moderate exercise and encouraged her efforts. Recommended start B vitamin complex for vegetarian diet if low on animal sources of B12.   2.  Labs: STI screening: GC/chlamydia. Recommend annual CMP, regular rescreening of hgb A1C, consider B12 and folate levels.  3. Breast awareness reviewed and patient encouraged to contact her provider with concerns.   4. Contraception: IUD, paragard, due for removal 11/2028  5. Pap smear  not  at today's visit. Next due for cervical cancer screening November 2024 .   6.  RTC 1 year for annual physical exam, PRN    45 minutes on the date of the encounter doing chart review, patient visit, documentation, and physical exam        Alejandra Roberts, DNP, APRN, CNM      "

## 2024-05-16 ASSESSMENT — ANXIETY QUESTIONNAIRES
8. IF YOU CHECKED OFF ANY PROBLEMS, HOW DIFFICULT HAVE THESE MADE IT FOR YOU TO DO YOUR WORK, TAKE CARE OF THINGS AT HOME, OR GET ALONG WITH OTHER PEOPLE?: NOT DIFFICULT AT ALL
5. BEING SO RESTLESS THAT IT IS HARD TO SIT STILL: NOT AT ALL
4. TROUBLE RELAXING: NOT AT ALL
7. FEELING AFRAID AS IF SOMETHING AWFUL MIGHT HAPPEN: NOT AT ALL
1. FEELING NERVOUS, ANXIOUS, OR ON EDGE: NOT AT ALL
GAD7 TOTAL SCORE: 2
6. BECOMING EASILY ANNOYED OR IRRITABLE: NOT AT ALL
2. NOT BEING ABLE TO STOP OR CONTROL WORRYING: SEVERAL DAYS
GAD7 TOTAL SCORE: 2
7. FEELING AFRAID AS IF SOMETHING AWFUL MIGHT HAPPEN: NOT AT ALL
GAD7 TOTAL SCORE: 2
IF YOU CHECKED OFF ANY PROBLEMS ON THIS QUESTIONNAIRE, HOW DIFFICULT HAVE THESE PROBLEMS MADE IT FOR YOU TO DO YOUR WORK, TAKE CARE OF THINGS AT HOME, OR GET ALONG WITH OTHER PEOPLE: NOT DIFFICULT AT ALL
3. WORRYING TOO MUCH ABOUT DIFFERENT THINGS: SEVERAL DAYS

## 2024-05-16 ASSESSMENT — PATIENT HEALTH QUESTIONNAIRE - PHQ9
SUM OF ALL RESPONSES TO PHQ QUESTIONS 1-9: 2
SUM OF ALL RESPONSES TO PHQ QUESTIONS 1-9: 2
10. IF YOU CHECKED OFF ANY PROBLEMS, HOW DIFFICULT HAVE THESE PROBLEMS MADE IT FOR YOU TO DO YOUR WORK, TAKE CARE OF THINGS AT HOME, OR GET ALONG WITH OTHER PEOPLE: NOT DIFFICULT AT ALL

## 2024-05-17 ENCOUNTER — OFFICE VISIT (OUTPATIENT)
Dept: MIDWIFE SERVICES | Facility: CLINIC | Age: 24
End: 2024-05-17
Payer: COMMERCIAL

## 2024-05-17 VITALS
SYSTOLIC BLOOD PRESSURE: 106 MMHG | WEIGHT: 223 LBS | BODY MASS INDEX: 42.1 KG/M2 | HEART RATE: 76 BPM | DIASTOLIC BLOOD PRESSURE: 78 MMHG | HEIGHT: 61 IN

## 2024-05-17 DIAGNOSIS — Z01.419 ENCOUNTER FOR GYNECOLOGICAL EXAMINATION WITHOUT ABNORMAL FINDING: Primary | ICD-10-CM

## 2024-05-17 DIAGNOSIS — F33.42 RECURRENT MAJOR DEPRESSIVE DISORDER, IN FULL REMISSION (H): ICD-10-CM

## 2024-05-17 DIAGNOSIS — Z11.3 ROUTINE SCREENING FOR STI (SEXUALLY TRANSMITTED INFECTION): ICD-10-CM

## 2024-05-17 PROCEDURE — 87491 CHLMYD TRACH DNA AMP PROBE: CPT | Performed by: ADVANCED PRACTICE MIDWIFE

## 2024-05-17 PROCEDURE — 99395 PREV VISIT EST AGE 18-39: CPT | Performed by: ADVANCED PRACTICE MIDWIFE

## 2024-05-17 PROCEDURE — 87591 N.GONORRHOEAE DNA AMP PROB: CPT | Performed by: ADVANCED PRACTICE MIDWIFE

## 2024-05-17 RX ORDER — LORATADINE 10 MG/1
10 TABLET ORAL DAILY
COMMUNITY

## 2024-05-17 NOTE — PATIENT INSTRUCTIONS
A Healthy You!    Getting and Staying Active  Why should I exercise?   Exercise, being physically active, is very important for all women.   Being active can help you:   Lose or maintain weight   Have more energy   Sleep better   Enjoy sex more   Relieve stress and think better   Lower the chance you will have heart disease, high blood pressure, and diabetes   Strengthen your bones and muscles   Have fewer hot flashes if you are older   How active do I have to be to get the bene?ts of exercise?   Studies show that as little as 15 minutes of moderate exercise--like fast walking or dancing--3 times a week can improve the health of your heart. Ifyou want to get the best benefits from exercise, try to increase your physical activity to at least 30 minutes, 5 times a week. If you have a serious health problem,be sure to talk with your health care provider before starting an exercise program.    Taking Care of your Health: Health Care Maintenance Screening recommendations  In all the things women do, taking care of everything and everybody else, they sometimes forget to take care of themselves. This handout reviews the health screenings and vaccines that are recommended for women of all ages. Talk with yourhealth care provider about which tests and vaccines you need. The chart below lists the screening tests and vaccinations recommended for healthy women who do not have a high risk for most diseases.   The recommendations in thischart are guidelines only. For some tests and vaccines, the chart says you should talk to your health care provider.This is because the best recommendations for you depend on your personal health history. Your health care provider may suggest more frequent testing or additional tests ifyou havea higher chance of getting some diseases    Planning Your Family: Developing a Reproductive Life Plan    Planning ahead can help you avoid getting pregnant when you don t want to be pregnant and also be in  "good health if and when you do decide to become pregnant. Many women have at least one pregnancy in their lives, even if it was not planned. In fact, about half of all pregnancies are not planned. Getting pregnant when you did not plan it can be a problem, or it can turn into a happy event. Planning pregnancy leads to healthier pregnancies, healthier mothers, and healthier families.  Although many women want to have a family, not everyone wants to have children. More and more women are childless by choice (also known as childfree). Whether to have children is a personal choice that only you can make. It s okay not to want children! If you never want to get pregnant, it is important to make sure you always use very effective birth control, such as an intrauterine device, the birth control implant, female sterilization (having your tubes tied), or your partner having a vasectomy.    Reliable resources:    ATP IV Guidelines  Pooled Cohorts Equation Calculator  Breast Cancer Risk Calculator  FRAX Risk Assessment  ICSI Preventive Guidelines  Dietary Guidelines for Americans, 2010  Project Manager's MyPlate  ASA Prophylaxis  Lung CA Screening      American College of Nurse-Midwives (ACNM) http://www.midwife.org/; look at the informational handouts at http://www.midwife.org/Share-With-Women        Women's Health.gov:  http://www.womenshealth.gov/a-z-topics/index.html    FDA - Nutrition  www.mypyramid.gov  Under \"For Consumers,\" click on \"pregnant and breastfeeding women.\"      Vaccines : Centers for Disease Control and Prevention (CDC) http://www.cdc.gov/vaccines/     [facility] www.Daz 3d.com     When researching information on the web, question the validity of websites.  The domains .gov, .edu and.org tend to be more reliable information.  If there are a lot of advertisements, be cautious of the information provided. Stay away from blogs and chat rooms please!          Nutrition and supplements:     Daily multivitamin vitamin " (with 400 - 1000 mcg folic acid).  Take with food as needed.     4-5 servings of dairy or other calcium rich foods (fish, leafy greens, soy) per day - if not, take 8096-4664 mg additional calcium (Tums, pills, chews). Take with dairy.     Vitamin D3 4000 IU geltab daily. Vitamin D rich foods: Cod Liver Oil (1Tbsp), [place], Mackerel, Tuna, fortified milk and yogurt, Beef and liver, sardines, egg, fortified cereals, swiss cheese.  Take supplements with fattiest meal.       2-3 (4) oz servings of fish, seafood, nuts (walnuts & almonds), oils, avocado per week - if not, take Omega 3 Fatty acids: DHA & LISA 4085-1873 mg per day.  Other names: cod liver oil, fish oil. Take with fattiest meal.

## 2024-05-18 LAB
C TRACH DNA SPEC QL PROBE+SIG AMP: NEGATIVE
N GONORRHOEA DNA SPEC QL NAA+PROBE: NEGATIVE

## 2024-05-20 SDOH — HEALTH STABILITY: PHYSICAL HEALTH: ON AVERAGE, HOW MANY MINUTES DO YOU ENGAGE IN EXERCISE AT THIS LEVEL?: 30 MIN

## 2024-05-20 SDOH — HEALTH STABILITY: PHYSICAL HEALTH: ON AVERAGE, HOW MANY DAYS PER WEEK DO YOU ENGAGE IN MODERATE TO STRENUOUS EXERCISE (LIKE A BRISK WALK)?: 3 DAYS

## 2024-05-20 ASSESSMENT — SOCIAL DETERMINANTS OF HEALTH (SDOH): HOW OFTEN DO YOU GET TOGETHER WITH FRIENDS OR RELATIVES?: TWICE A WEEK

## 2024-05-24 ENCOUNTER — OFFICE VISIT (OUTPATIENT)
Dept: PEDIATRICS | Facility: CLINIC | Age: 24
End: 2024-05-24
Payer: COMMERCIAL

## 2024-05-24 VITALS
BODY MASS INDEX: 42.44 KG/M2 | DIASTOLIC BLOOD PRESSURE: 60 MMHG | WEIGHT: 224.8 LBS | HEART RATE: 100 BPM | RESPIRATION RATE: 18 BRPM | SYSTOLIC BLOOD PRESSURE: 108 MMHG | TEMPERATURE: 98.3 F | HEIGHT: 61 IN | OXYGEN SATURATION: 98 %

## 2024-05-24 DIAGNOSIS — G44.219 EPISODIC TENSION-TYPE HEADACHE, NOT INTRACTABLE: ICD-10-CM

## 2024-05-24 DIAGNOSIS — E66.01 CLASS 3 SEVERE OBESITY DUE TO EXCESS CALORIES WITHOUT SERIOUS COMORBIDITY WITH BODY MASS INDEX (BMI) OF 40.0 TO 44.9 IN ADULT (H): ICD-10-CM

## 2024-05-24 DIAGNOSIS — Z00.00 ENCOUNTER FOR PREVENTATIVE ADULT HEALTH CARE EXAMINATION: Primary | ICD-10-CM

## 2024-05-24 DIAGNOSIS — E66.813 CLASS 3 SEVERE OBESITY DUE TO EXCESS CALORIES WITHOUT SERIOUS COMORBIDITY WITH BODY MASS INDEX (BMI) OF 40.0 TO 44.9 IN ADULT (H): ICD-10-CM

## 2024-05-24 DIAGNOSIS — R09.81 NASAL CONGESTION: ICD-10-CM

## 2024-05-24 LAB
ALBUMIN SERPL BCG-MCNC: 4.2 G/DL (ref 3.5–5.2)
ALP SERPL-CCNC: 83 U/L (ref 40–150)
ALT SERPL W P-5'-P-CCNC: 15 U/L (ref 0–50)
ANION GAP SERPL CALCULATED.3IONS-SCNC: 12 MMOL/L (ref 7–15)
AST SERPL W P-5'-P-CCNC: 16 U/L (ref 0–45)
BILIRUB SERPL-MCNC: 0.3 MG/DL
BUN SERPL-MCNC: 8.1 MG/DL (ref 6–20)
CALCIUM SERPL-MCNC: 9.1 MG/DL (ref 8.6–10)
CHLORIDE SERPL-SCNC: 102 MMOL/L (ref 98–107)
CHOLEST SERPL-MCNC: 169 MG/DL
CREAT SERPL-MCNC: 0.69 MG/DL (ref 0.51–0.95)
DEPRECATED HCO3 PLAS-SCNC: 24 MMOL/L (ref 22–29)
EGFRCR SERPLBLD CKD-EPI 2021: >90 ML/MIN/1.73M2
FASTING STATUS PATIENT QL REPORTED: YES
FASTING STATUS PATIENT QL REPORTED: YES
GLUCOSE SERPL-MCNC: 94 MG/DL (ref 70–99)
HBA1C MFR BLD: 5.4 % (ref 0–5.6)
HDLC SERPL-MCNC: 38 MG/DL
LDLC SERPL CALC-MCNC: 100 MG/DL
NONHDLC SERPL-MCNC: 131 MG/DL
POTASSIUM SERPL-SCNC: 3.7 MMOL/L (ref 3.4–5.3)
PROT SERPL-MCNC: 7.6 G/DL (ref 6.4–8.3)
SODIUM SERPL-SCNC: 138 MMOL/L (ref 135–145)
T4 FREE SERPL-MCNC: 1.26 NG/DL (ref 0.9–1.7)
TRIGL SERPL-MCNC: 153 MG/DL
TSH SERPL DL<=0.005 MIU/L-ACNC: 4.31 UIU/ML (ref 0.3–4.2)

## 2024-05-24 PROCEDURE — 83036 HEMOGLOBIN GLYCOSYLATED A1C: CPT | Performed by: NURSE PRACTITIONER

## 2024-05-24 PROCEDURE — 84439 ASSAY OF FREE THYROXINE: CPT | Performed by: NURSE PRACTITIONER

## 2024-05-24 PROCEDURE — 84443 ASSAY THYROID STIM HORMONE: CPT | Performed by: NURSE PRACTITIONER

## 2024-05-24 PROCEDURE — 80061 LIPID PANEL: CPT | Performed by: NURSE PRACTITIONER

## 2024-05-24 PROCEDURE — 99213 OFFICE O/P EST LOW 20 MIN: CPT | Mod: 25 | Performed by: NURSE PRACTITIONER

## 2024-05-24 PROCEDURE — 99395 PREV VISIT EST AGE 18-39: CPT | Performed by: NURSE PRACTITIONER

## 2024-05-24 PROCEDURE — 36415 COLL VENOUS BLD VENIPUNCTURE: CPT | Performed by: NURSE PRACTITIONER

## 2024-05-24 PROCEDURE — 80053 COMPREHEN METABOLIC PANEL: CPT | Performed by: NURSE PRACTITIONER

## 2024-05-24 ASSESSMENT — PAIN SCALES - GENERAL: PAINLEVEL: NO PAIN (0)

## 2024-05-24 NOTE — PROGRESS NOTES
"Preventive Care Visit  [facility] ERMA Cee CNP, Family Medicine  May 24, 2024      Assessment & Plan     Encounter for preventative adult health care examination  Age appropriate screening and preventative care have been addressed today. Vaccinations have been reviewed and are up to date. Patient has been advised to follow a balanced diet. They have been advised to undertake routine aerobic activity. Recommend annual vision exams as well as biannual dental exams. They will follow up for annual physical again in one year.     Body mass index 40.0-44.9, adult (H)  Body mass index is 43 kg/m . Reviewed healthy lifestyle - goal for 30 min moderate intensity exercise 5 days/week and healthy dietary choices, cooking at home when possible.   - Comprehensive metabolic panel (BMP + Alb, Alk Phos, ALT, AST, Total. Bili, TP)  - Lipid panel reflex to direct LDL Fasting  - Hemoglobin A1c  - TSH with free T4 reflex    Nasal congestion  Start flonase. If no improvement in 1 month, return for follow-up (she can send me a [employer] message).     Episodic tension-type headache, not intractable  Increased frequency. Reviewed lifestyle - adequate hydration, sleep. Treat with NSAIDs as needed being careful not to use >8-10 days/month to avoid rebound headache.           BMI  Estimated body mass index is 43 kg/m  as calculated from the following:    Height as of this encounter: 1.54 m (5' 0.63\").    Weight as of this encounter: 102 kg (224 lb 12.8 oz).   Weight management plan: Discussed healthy diet and exercise guidelines    Counseling  Appropriate preventive services were discussed with this patient, including applicable screening as appropriate for fall prevention, nutrition, physical activity, Tobacco-use cessation, weight loss and cognition.  Checklist reviewing preventive services available has been given to the patient.  Reviewed patient's diet, addressing concerns and/or questions.   She is at risk " for lack of exercise and has been provided with information to increase physical activity for the benefit of her well-being.   She is at risk for psychosocial distress and has been provided with information to reduce risk.         Ermias Infante is a 24 year old, presenting for the following:  Physical        5/24/2024     9:47 AM   Additional Questions   Roomed by Willow Garcia   Accompanied by RACHEL SORENSON    Congestion to right nostril x multiple months now - neti pot, saline spray, oral claritin.     Has been getting more frequent headaches, couple times/week, last 1-2 hours. Located to front of head, dull ache.     Couple times/week exercise - hiking, biking, home workouts.     Typically eats meals at home.         5/20/2024   General Health   How would you rate your overall physical health? Good   Feel stress (tense, anxious, or unable to sleep) Only a little   (!) STRESS CONCERN      5/20/2024   Nutrition   Three or more servings of calcium each day? (!) I DON'T KNOW   Diet: Vegetarian/vegan    Breakfast skipped   How many servings of fruit and vegetables per day? (!) 2-3   How many sweetened beverages each day? 0-1         5/20/2024   Exercise   Days per week of moderate/strenous exercise 3 days   Average minutes spent exercising at this level 30 min         5/20/2024   Social Factors   Frequency of gathering with friends or relatives Twice a week   Worry food won't last until get money to buy more No   Food not last or not have enough money for food? No   Do you have housing?  Yes   Are you worried about losing your housing? No   Lack of transportation? No   Unable to get utilities (heat,electricity)? No         5/20/2024   Dental   Dentist two times every year? Yes         5/20/2024   TB Screening   Were you born outside of the [place]? No         Today's PHQ-2 Score:       5/24/2024     9:02 AM   PHQ-2 ( 1999 [employer])   Q1: Little interest or pleasure in doing things 0   Q2: Feeling down, depressed or  hopeless 0   PHQ-2 Score 0   Q1: Little interest or pleasure in doing things Not at all   Q2: Feeling down, depressed or hopeless Not at all   PHQ-2 Score 0           5/20/2024   Substance Use   Alcohol more than 3/day or more than 7/wk No   Do you use any other substances recreationally? No     Social History     Tobacco Use    Smoking status: Never     Passive exposure: Never    Smokeless tobacco: Never   Vaping Use    Vaping status: Never Used   Substance Use Topics    Alcohol use: Not Currently     Comment: 2-3 drinks per month or less    Drug use: Not Currently     Types: Marijuana     Comment: every 2 months             5/20/2024   Breast Cancer Screening   Family history of breast, colon, or ovarian cancer? No / Unknown      Mammogram Screening - Patient under 40 years of age: Routine Mammogram Screening not recommended.         5/20/2024   STI Screening   New sexual partner(s) since last STI/HIV test? No     History of abnormal Pap smear: No - age 21-29 PAP every 3 years recommended        11/29/2021     4:07 PM   PAP / HPV   PAP Negative for Intraepithelial Lesion or Malignancy (NILM)            5/20/2024   Contraception/Family Planning   Questions about contraception or family planning No        Reviewed and updated as needed this visit by Provider                    Patient Active Problem List   Diagnosis    Encounter for insertion of intrauterine contraceptive device    Depression    Wears glasses    Body mass index 40.0-44.9, adult (H)     Past Surgical History:   Procedure Laterality Date    MOUTH SURGERY Bilateral 2019    Shepherd teeth extraction       Social History     Tobacco Use    Smoking status: Never     Passive exposure: Never    Smokeless tobacco: Never   Substance Use Topics    Alcohol use: Not Currently     Comment: 2-3 drinks per month or less     Family History   Problem Relation Age of Onset    Soft Tissue Cancer Mother         sarcoma    Cancer Mother         Retroperitoneal sarcoma  "removed in  and recurred in .    Other Cancer Mother     Hyperlipidemia Father         Boderline, no meds    Hypertension Father     Myocardial Infarction Father     Diabetes Maternal Grandmother     Thyroid Disease Maternal Grandmother     Thyroid Disease Maternal Grandfather         possible    Hypertension Maternal Grandfather     Diabetes Paternal Grandfather     Leukemia Paternal Grandfather 65    Heart Disease Paternal Grandfather 65        MI    Cancer Paternal Grandfather 70        Leukemia-    Psoriatic Arthritis Maternal Uncle               Objective    Exam  /60 (BP Location: Right arm, Patient Position: Sitting, Cuff Size: Adult Large)   Pulse 100   Temp 98.3  F (36.8  C) (Tympanic)   Resp 18   Ht 1.54 m (5' 0.63\")   Wt 102 kg (224 lb 12.8 oz)   LMP 2024   SpO2 98%   BMI 43.00 kg/m     Estimated body mass index is 42.83 kg/m  as calculated from the following:    Height as of 24: 1.537 m (5' 0.5\").    Weight as of 24: 101.2 kg (223 lb).    Physical Exam  Constitutional: appears to be in no acute distress, comfortable, pleasant.   Eyes: anicteric, conjunctiva clear without drainage or erythema. SHERRI.   Ears, Nose and Throat: tympanic membranes gray with LR,  nose without nasal discharge. OP: no erythema to posterior pharynx, negative post nasal drainage, tonsils +1 no erythema or exudate.  Neck: supple, thyroid palpable,not enlarged, no nodules   Breast: Exam deferred (deferred after discussion of exam options with patient, no symptoms or concerns).   Cardiovascular: regular rate and rhythm, normal S1 and S2, no murmurs, rubs or gallops, peripheral pulses full and symmetric; negative peripheral edema   Respiratory: Air entry throughout. Breathing pattern unlabored without the use of accessory muscles. Clear to auscultation A and P, no wheezes or crackles, normal breath sounds.    Gastrointestinal: rounded, soft. Positive bowel sounds x4, nontender, no masses. "   Genitourinary: Exam deferred (deferred after discussion of exam options with patient, no symptoms or concerns, pap is up to date).   Musculoskeletal: full range of motion, no edema.   Skin: pink, turgor smooth and elastic. Negative for lesions or dryness.  Neurological: normal gait, no tremor.   Psychological: appropriate mood and affect.   Lymphatic: no cervical, axillary, supraclavicular, or infraclavicular lymphadenopathy.        Signed Electronically by: ERMA Ochoa CNP

## 2024-05-31 DIAGNOSIS — E03.8 SUBCLINICAL HYPOTHYROIDISM: Primary | ICD-10-CM

## 2024-06-05 ENCOUNTER — TELEPHONE (OUTPATIENT)
Dept: PEDIATRICS | Facility: CLINIC | Age: 24
End: 2024-06-05
Payer: COMMERCIAL

## 2024-06-05 NOTE — TELEPHONE ENCOUNTER
RN attempted to reach patient to relay result note below. LVMTCB and speak with nurse.          Dear Arelis,     Your cholesterol is a little bit elevated, however not to a level that is overly concerning. I recommend efforts towards a healthy lifestyle to help decrease cholesterol levels. This includes eating a healthy diet rich in veggies and low in processed carbs and added sugars. Regular physical exercise is also important!     Your metabolic panel shows normal liver and kidney function as well as normal electrolytes.     TSH is just a little bit high but your T4 is within normal range. This means that your brain is working harder to tell your thyroid to push out more hormone but your actual thyroid hormone remains within normal limits. This is called subclinical hypothyroidism. The decision to treat subclinical hypothyroidism is somewhat controversial. Often I will check some antibodies that can give us an idea of the likelihood that you will go on to develop overt hypothyroidism. I think this would be a good idea, if you are open to it! I will place orders. If I see results, I will be in touch again over [employer]. If you choose not to do this additional lab work, I would recommend we recheck your thyroid levels in a couple months. Let me know if you choose to go this route.     Luba Moreno, DNP, APRN, CNP     Faviola COOPER RN on 6/5/2024 at 2:04 PM

## 2024-06-05 NOTE — TELEPHONE ENCOUNTER
I received notification that patient did not view her result note. Could you please call her and relay my message?    Thanks,   Luba Smith, DNP, APRN, CNP

## 2024-06-07 NOTE — TELEPHONE ENCOUNTER
#3    Called patient and left voicemail to call back and ask to speak to any triage nurse.     MCM sent.     Myranda Wilhelm RN

## 2024-06-13 ENCOUNTER — TELEPHONE (OUTPATIENT)
Dept: PEDIATRICS | Facility: CLINIC | Age: 24
End: 2024-06-13
Payer: COMMERCIAL

## 2024-06-17 ENCOUNTER — LAB (OUTPATIENT)
Dept: LAB | Facility: CLINIC | Age: 24
End: 2024-06-17
Payer: COMMERCIAL

## 2024-06-17 DIAGNOSIS — E03.8 SUBCLINICAL HYPOTHYROIDISM: ICD-10-CM

## 2024-06-17 PROCEDURE — 36415 COLL VENOUS BLD VENIPUNCTURE: CPT

## 2024-06-17 PROCEDURE — 86376 MICROSOMAL ANTIBODY EACH: CPT

## 2024-06-18 LAB — THYROPEROXIDASE AB SERPL-ACNC: <10 IU/ML

## 2024-06-20 DIAGNOSIS — E03.8 SUBCLINICAL HYPOTHYROIDISM: Primary | ICD-10-CM

## 2024-08-04 DIAGNOSIS — F32.89 OTHER DEPRESSION: ICD-10-CM

## 2024-08-05 RX ORDER — SERTRALINE HYDROCHLORIDE 25 MG/1
25 TABLET, FILM COATED ORAL DAILY
Qty: 90 TABLET | Refills: 0 | Status: SHIPPED | OUTPATIENT
Start: 2024-08-05

## 2024-08-20 ENCOUNTER — LAB (OUTPATIENT)
Dept: LAB | Facility: CLINIC | Age: 24
End: 2024-08-20
Payer: COMMERCIAL

## 2024-08-20 DIAGNOSIS — E03.8 SUBCLINICAL HYPOTHYROIDISM: ICD-10-CM

## 2024-08-20 PROCEDURE — 84443 ASSAY THYROID STIM HORMONE: CPT

## 2024-08-20 PROCEDURE — 36415 COLL VENOUS BLD VENIPUNCTURE: CPT

## 2024-08-21 LAB — TSH SERPL DL<=0.005 MIU/L-ACNC: 3.9 UIU/ML (ref 0.3–4.2)

## 2025-05-08 ENCOUNTER — PATIENT OUTREACH (OUTPATIENT)
Dept: CARE COORDINATION | Facility: CLINIC | Age: 25
End: 2025-05-08
Payer: COMMERCIAL

## 2025-07-13 ENCOUNTER — HEALTH MAINTENANCE LETTER (OUTPATIENT)
Age: 25
End: 2025-07-13